# Patient Record
Sex: MALE | Race: WHITE
[De-identification: names, ages, dates, MRNs, and addresses within clinical notes are randomized per-mention and may not be internally consistent; named-entity substitution may affect disease eponyms.]

---

## 2018-01-08 ENCOUNTER — HOSPITAL ENCOUNTER (INPATIENT)
Dept: HOSPITAL 26 - MED | Age: 53
LOS: 11 days | Discharge: HOME | DRG: 720 | End: 2018-01-19
Attending: FAMILY MEDICINE | Admitting: FAMILY MEDICINE
Payer: COMMERCIAL

## 2018-01-08 VITALS — WEIGHT: 118 LBS | HEIGHT: 63 IN | BODY MASS INDEX: 20.91 KG/M2

## 2018-01-08 VITALS — SYSTOLIC BLOOD PRESSURE: 94 MMHG | DIASTOLIC BLOOD PRESSURE: 53 MMHG

## 2018-01-08 VITALS — DIASTOLIC BLOOD PRESSURE: 53 MMHG | SYSTOLIC BLOOD PRESSURE: 87 MMHG

## 2018-01-08 VITALS — DIASTOLIC BLOOD PRESSURE: 68 MMHG | SYSTOLIC BLOOD PRESSURE: 110 MMHG

## 2018-01-08 DIAGNOSIS — D63.8: ICD-10-CM

## 2018-01-08 DIAGNOSIS — Z99.11: ICD-10-CM

## 2018-01-08 DIAGNOSIS — Z79.899: ICD-10-CM

## 2018-01-08 DIAGNOSIS — E11.51: ICD-10-CM

## 2018-01-08 DIAGNOSIS — J15.212: ICD-10-CM

## 2018-01-08 DIAGNOSIS — I70.209: ICD-10-CM

## 2018-01-08 DIAGNOSIS — I69.354: ICD-10-CM

## 2018-01-08 DIAGNOSIS — N39.0: ICD-10-CM

## 2018-01-08 DIAGNOSIS — G93.40: ICD-10-CM

## 2018-01-08 DIAGNOSIS — D68.59: ICD-10-CM

## 2018-01-08 DIAGNOSIS — Z93.0: ICD-10-CM

## 2018-01-08 DIAGNOSIS — G47.411: ICD-10-CM

## 2018-01-08 DIAGNOSIS — B96.5: ICD-10-CM

## 2018-01-08 DIAGNOSIS — E11.43: ICD-10-CM

## 2018-01-08 DIAGNOSIS — G90.9: ICD-10-CM

## 2018-01-08 DIAGNOSIS — J40: ICD-10-CM

## 2018-01-08 DIAGNOSIS — J96.21: ICD-10-CM

## 2018-01-08 DIAGNOSIS — L89.159: ICD-10-CM

## 2018-01-08 DIAGNOSIS — A41.9: Primary | ICD-10-CM

## 2018-01-08 DIAGNOSIS — N17.0: ICD-10-CM

## 2018-01-08 DIAGNOSIS — I61.9: ICD-10-CM

## 2018-01-08 DIAGNOSIS — I10: ICD-10-CM

## 2018-01-08 DIAGNOSIS — G93.89: ICD-10-CM

## 2018-01-08 DIAGNOSIS — J69.0: ICD-10-CM

## 2018-01-08 DIAGNOSIS — B96.89: ICD-10-CM

## 2018-01-08 DIAGNOSIS — E43: ICD-10-CM

## 2018-01-08 DIAGNOSIS — Z93.1: ICD-10-CM

## 2018-01-08 DIAGNOSIS — G40.909: ICD-10-CM

## 2018-01-08 LAB
ALBUMIN FLD-MCNC: 2.2 G/DL (ref 3.4–5)
AMYLASE SERPL-CCNC: 27 U/L (ref 25–115)
ANION GAP SERPL CALCULATED.3IONS-SCNC: 10 MMOL/L (ref 8–16)
APPEARANCE UR: (no result)
AST SERPL-CCNC: 25 U/L (ref 15–37)
BARBITURATES UR QL SCN: (no result) NG/ML
BENZODIAZ UR QL SCN: (no result) NG/ML
BILIRUB SERPL-MCNC: 0.2 MG/DL (ref 0–1)
BILIRUB UR QL STRIP: NEGATIVE
BUN SERPL-MCNC: 23 MG/DL (ref 7–18)
BZE UR QL SCN: (no result) NG/ML
CANNABINOIDS UR QL SCN: (no result) NG/ML
CHLORIDE SERPL-SCNC: 104 MMOL/L (ref 98–107)
CHOLEST/HDLC SERPL: 3.7 {RATIO} (ref 1–4.5)
CO2 SERPL-SCNC: 33.3 MMOL/L (ref 21–32)
COLOR UR: YELLOW
CREAT SERPL-MCNC: 0.8 MG/DL (ref 0.7–1.3)
ERYTHROCYTE [DISTWIDTH] IN BLOOD BY AUTOMATED COUNT: 13.7 % (ref 11.6–13.7)
GFR SERPL CREATININE-BSD FRML MDRD: 130 ML/MIN (ref 90–?)
GLUCOSE SERPL-MCNC: 150 MG/DL (ref 74–106)
GLUCOSE UR STRIP-MCNC: NEGATIVE MG/DL
HCT VFR BLD AUTO: 24.8 % (ref 36–52)
HDLC SERPL-MCNC: 26 MG/DL (ref 40–60)
HGB BLD-MCNC: 8.3 G/DL (ref 12–18)
HGB UR QL STRIP: (no result)
LDLC SERPL CALC-MCNC: 47 MG/DL (ref 60–100)
LEUKOCYTE ESTERASE UR QL STRIP: (no result)
LIPASE SERPL-CCNC: 117 U/L (ref 73–393)
LYMPHOCYTES NFR BLD MANUAL: 4 % (ref 20–46)
MAGNESIUM SERPL-MCNC: 1.9 MG/DL (ref 1.8–2.4)
MCH RBC QN AUTO: 30 PG (ref 27–31)
MCHC RBC AUTO-ENTMCNC: 33 G/DL (ref 33–37)
MCV RBC AUTO: 89 FL (ref 80–94)
MONOCYTES NFR BLD MANUAL: 3 % (ref 5–12)
NITRITE UR QL STRIP: POSITIVE
OPIATES UR QL SCN: (no result) NG/ML
PCP UR QL SCN: (no result) NG/ML
PH UR STRIP: 7 [PH] (ref 5–9)
PHOSPHATE SERPL-MCNC: 4.2 MG/DL (ref 2.5–4.9)
PLATELET # BLD AUTO: 541 K/UL (ref 140–450)
POTASSIUM SERPL-SCNC: 4.3 MMOL/L (ref 3.5–5.1)
PROTHROMBIN TIME: 10.7 SECS (ref 10.8–13.4)
RBC # BLD AUTO: 2.8 MIL/UL (ref 4.2–6.1)
RBC #/AREA URNS HPF: (no result) /HPF (ref 0–5)
SODIUM SERPL-SCNC: 143 MMOL/L (ref 136–145)
T4 FREE SERPL-MCNC: 1.15 NG/DL (ref 0.76–1.46)
TRIGL SERPL-MCNC: 121 MG/DL (ref 30–150)
TSH SERPL DL<=0.05 MIU/L-ACNC: 1.89 UIU/ML (ref 0.34–3.74)
WBC # BLD AUTO: 31.6 K/UL (ref 4.8–10.8)
WBC,URINE: (no result) /HPF (ref 0–5)

## 2018-01-08 PROCEDURE — C9113 INJ PANTOPRAZOLE SODIUM, VIA: HCPCS

## 2018-01-08 PROCEDURE — 5A1955Z RESPIRATORY VENTILATION, GREATER THAN 96 CONSECUTIVE HOURS: ICD-10-PCS | Performed by: FAMILY MEDICINE

## 2018-01-08 PROCEDURE — 5A1935Z RESPIRATORY VENTILATION, LESS THAN 24 CONSECUTIVE HOURS: ICD-10-PCS | Performed by: FAMILY MEDICINE

## 2018-01-08 RX ADMIN — SODIUM CHLORIDE SCH MLS/HR: 9 INJECTION, SOLUTION INTRAVENOUS at 23:24

## 2018-01-08 NOTE — NUR
ADMITTED THIS 52 YEAR OLD MALE FROM ER PER [facility] WITH CC OF ALOC AND BLEEDING FROM 
TRACHEOSTOMY, TRANSFERRED TO BED AND ATTACHED TO VENTILATOR BY RT LEONARDO, ASSESSMENT DONE, 
VITAL SIGNS STABLE, PT AWAKE, APHASIC, WITH TRACH TO VENT, NO BLEEDING NOTED TO TRACHEOSTOMY 
SITE, G-TUBE CLAMPED, LEE CATHETER DRAINING YELLOW URINE, BEDBOUND, BM WITH BLACK SOFT 
STOOL, MODERATE AMOUNT, PERINEAL CARE DONE, HEALED WOUND TO UPPER BACK, SACRAL PRESSURE 
ULCER HEALING, DRESSING CHANGE DONE WITH OPTIFOAM APPLIED, SAFETY MEASURES IN PLACE, SIDE 
RAILS UP AND BED ALARM ON, WILL REPOSITION Q2H AND OFFLOAD PRESSURE AREAS.

## 2018-01-08 NOTE — NUR
PATIENT TRANSFERRED TO ROOM 124B VIA 6LPM AMBUBAG AND THEN PLACED ON VENTILATOR AC-12, 400, 
+5 FIO2 28% SAO2 100% BS BILATERAL HR 106BPM

## 2018-01-08 NOTE — NUR
52M BIBA FROM [facility] FOR ALOC SINCE THIS AM AND BLEEDING FROM TRACH COUPLE OF DAYS 
AGO; PER REPORT FROM [facility]IC, PT NEURO BASELINE IS AO, ABLE TO FOLLOW 
COMMANDS. PER REPORT, PT'S TRACH WAS CHANGED TODAY. PT HAS SPONTANEOUS EYE 
OPENING, EQUAL BL ARM , AND FOLLOW SIMPLE COMMANDS. RR ARE EVEN AND 
TACHYPNEIC. RT BY BEDSIDE, TRACH TO VENT. PT ARRIVED WITH G TUBE AND LEE. NAD 
AT THIS TIME. ER MD AWARE OF PT STATUS. WILL CONTINUE TO MONITOR.

## 2018-01-08 NOTE — NUR
Patient will be admitted to care of DR SANTIAGO. Admited to TELE 124B.  Will 
go to room 124B. Belongings list completed.  Report to VALERIA MADRID .

## 2018-01-09 VITALS — SYSTOLIC BLOOD PRESSURE: 138 MMHG | DIASTOLIC BLOOD PRESSURE: 56 MMHG

## 2018-01-09 VITALS — SYSTOLIC BLOOD PRESSURE: 134 MMHG | DIASTOLIC BLOOD PRESSURE: 71 MMHG

## 2018-01-09 VITALS — SYSTOLIC BLOOD PRESSURE: 160 MMHG | DIASTOLIC BLOOD PRESSURE: 78 MMHG

## 2018-01-09 VITALS — SYSTOLIC BLOOD PRESSURE: 130 MMHG | DIASTOLIC BLOOD PRESSURE: 81 MMHG

## 2018-01-09 VITALS — DIASTOLIC BLOOD PRESSURE: 76 MMHG | SYSTOLIC BLOOD PRESSURE: 143 MMHG

## 2018-01-09 LAB
ANION GAP SERPL CALCULATED.3IONS-SCNC: 12.5 MMOL/L (ref 8–16)
BASOPHILS # BLD AUTO: 0.5 K/UL (ref 0–0.22)
BASOPHILS NFR BLD AUTO: 1.9 % (ref 0–2)
BUN SERPL-MCNC: 19 MG/DL (ref 7–18)
CHLORIDE SERPL-SCNC: 107 MMOL/L (ref 98–107)
CO2 SERPL-SCNC: 30.1 MMOL/L (ref 21–32)
CREAT SERPL-MCNC: 0.5 MG/DL (ref 0.7–1.3)
EOSINOPHIL # BLD AUTO: 0.2 K/UL (ref 0–0.4)
EOSINOPHIL NFR BLD AUTO: 0.8 % (ref 0–4)
ERYTHROCYTE [DISTWIDTH] IN BLOOD BY AUTOMATED COUNT: 13.6 % (ref 11.6–13.7)
GFR SERPL CREATININE-BSD FRML MDRD: 223 ML/MIN (ref 90–?)
GLUCOSE SERPL-MCNC: 126 MG/DL (ref 74–106)
HCT VFR BLD AUTO: 23.6 % (ref 36–52)
HGB BLD-MCNC: 7.7 G/DL (ref 12–18)
IRON SERPL-MCNC: 8 UG/DL (ref 35–150)
LYMPHOCYTES # BLD AUTO: 1.7 K/UL (ref 2–11.5)
LYMPHOCYTES NFR BLD AUTO: 6.5 % (ref 20.5–51.1)
MAGNESIUM SERPL-MCNC: 1.9 MG/DL (ref 1.8–2.4)
MCH RBC QN AUTO: 29 PG (ref 27–31)
MCHC RBC AUTO-ENTMCNC: 33 G/DL (ref 33–37)
MCV RBC AUTO: 90 FL (ref 80–94)
MONOCYTES # BLD AUTO: 0.9 K/UL (ref 0.8–1)
MONOCYTES NFR BLD AUTO: 3.4 % (ref 1.7–9.3)
NEUTROPHILS # BLD AUTO: 23 K/UL (ref 1.8–7.7)
NEUTROPHILS NFR BLD AUTO: 87.4 % (ref 42.2–75.2)
PHOSPHATE SERPL-MCNC: 3.1 MG/DL (ref 2.5–4.9)
PLATELET # BLD AUTO: 530 K/UL (ref 140–450)
POTASSIUM SERPL-SCNC: 3.6 MMOL/L (ref 3.5–5.1)
RBC # BLD AUTO: 2.62 MIL/UL (ref 4.2–6.1)
SODIUM SERPL-SCNC: 146 MMOL/L (ref 136–145)
TIBC SERPL-MCNC: 123 UG/DL (ref 250–450)
WBC # BLD AUTO: 26.3 K/UL (ref 4.8–10.8)

## 2018-01-09 RX ADMIN — SODIUM CHLORIDE SCH MLS/HR: 9 INJECTION, SOLUTION INTRAVENOUS at 23:16

## 2018-01-09 RX ADMIN — IPRATROPIUM BROMIDE AND ALBUTEROL SULFATE SCH ML: .5; 3 SOLUTION RESPIRATORY (INHALATION) at 19:09

## 2018-01-09 RX ADMIN — IPRATROPIUM BROMIDE AND ALBUTEROL SULFATE SCH ML: .5; 3 SOLUTION RESPIRATORY (INHALATION) at 12:28

## 2018-01-09 RX ADMIN — LEVETIRACETAM SCH MG: 100 SOLUTION ORAL at 20:53

## 2018-01-09 RX ADMIN — Medication SCH MG: at 20:53

## 2018-01-09 RX ADMIN — DEXTROSE SCH MLS/HR: 50 INJECTION, SOLUTION INTRAVENOUS at 12:44

## 2018-01-09 RX ADMIN — MINERAL SUPPLEMENT IRON 300 MG / 5 ML STRENGTH LIQUID 100 PER BOX UNFLAVORED SCH MG: at 20:53

## 2018-01-09 RX ADMIN — MINERAL SUPPLEMENT IRON 300 MG / 5 ML STRENGTH LIQUID 100 PER BOX UNFLAVORED SCH MG: at 10:02

## 2018-01-09 RX ADMIN — PSYLLIUM HUSK SCH GM: 3.4 POWDER ORAL at 16:38

## 2018-01-09 RX ADMIN — DEXTROSE SCH MLS/HR: 50 INJECTION, SOLUTION INTRAVENOUS at 05:03

## 2018-01-09 RX ADMIN — LEVETIRACETAM SCH MG: 100 SOLUTION ORAL at 10:03

## 2018-01-09 RX ADMIN — SODIUM CHLORIDE SCH MLS/HR: 9 INJECTION, SOLUTION INTRAVENOUS at 10:04

## 2018-01-09 RX ADMIN — PSYLLIUM HUSK SCH GM: 3.4 POWDER ORAL at 10:02

## 2018-01-09 RX ADMIN — LEVOFLOXACIN SCH MLS/HR: 5 INJECTION, SOLUTION INTRAVENOUS at 20:53

## 2018-01-09 RX ADMIN — DEXTROSE SCH MLS/HR: 50 INJECTION, SOLUTION INTRAVENOUS at 17:09

## 2018-01-09 RX ADMIN — PANTOPRAZOLE SODIUM SCH MG: 40 INJECTION, POWDER, FOR SOLUTION INTRAVENOUS at 10:03

## 2018-01-09 RX ADMIN — DEXTROSE SCH MLS/HR: 50 INJECTION, SOLUTION INTRAVENOUS at 23:38

## 2018-01-09 RX ADMIN — IPRATROPIUM BROMIDE AND ALBUTEROL SULFATE SCH ML: .5; 3 SOLUTION RESPIRATORY (INHALATION) at 07:00

## 2018-01-09 RX ADMIN — ACETAMINOPHEN PRN MG: 160 SOLUTION ORAL at 19:40

## 2018-01-09 RX ADMIN — Medication SCH EACH: at 10:03

## 2018-01-09 RX ADMIN — PSYLLIUM HUSK SCH GM: 3.4 POWDER ORAL at 12:45

## 2018-01-09 RX ADMIN — Medication SCH DEV: at 16:38

## 2018-01-09 RX ADMIN — Medication SCH MG: at 10:03

## 2018-01-09 RX ADMIN — Medication SCH DEV: at 20:57

## 2018-01-09 NOTE — NUR
VENT CHECK, I\L TX GIVEN WITH DUONEB 3ML WITH NO ADVERSE REACTION POST TX B\S ARE COARSE AND 
SNX PT SMALL AMT OF BLOOD TINT SECRETIONS

## 2018-01-09 NOTE — NUR
VITAL SIGNS STABLE, OPEN EYES TO TOUCH, IVF INFUSING WELL, CALLED [facility] AND TALKED TO CHARGE 
NURSE JOHANNA, NO RECORD OF VACCINATION STATUS AND NO ISOLATION IN THERE FACILITY, WILL 
FOLLOW UP WITH FAMILY MEMBERS IN AM, CONTINUE TO MONITOR CLOSELY.

## 2018-01-09 NOTE — NUR
PT OPEN EYES TO TOUCH, VITAL SIGNS STABLE, AFEBRILE, ORAL CARE DONE, PT OPENS MOUTH ON 
COMMAND, SUCTION SECRETION PRN, IV ANTIBIOTIC INFUSING WELL, CONTINUE TO MONITOR CLOSELY.

## 2018-01-09 NOTE — NUR
PT AWAKE, PT CAN MOVE RT ARM AND ABLE TO SCRATCH FACE, RESTLESS, SR-122 ON TELE, 5ML G-TUBE 
RESIDUAL NOTED, TYLENOL GIVEN THRU G-TUBE, MONITORED CLOSELY.

## 2018-01-09 NOTE — NUR
BED BATH GIVEN, PERICARE DONE, DARK GREEN STOOL SOFT, COLLECTED, LEE CATH CARE DONE, PT 
RUTH WELL, RT AT BEDSIDE.

## 2018-01-09 NOTE — NUR
PT AWAKE, SWEATING, ORAL TEMP OF 98.9, FLACC-0, KEEP CLEAN AND DRY, IVF INFUSING WELL, 
G-TUBE ON-GOING, HOB ELEVATED, MONITORED CLOSELY.

## 2018-01-09 NOTE — NUR
PATIENT HAS BEEN SCREENED AND CATEGORIZED AS HIGH NUTRITION RISK. PATIENT WILL BE SEEN 
WITHIN 1-2 DAYS OF ADMISSION.



1/9/18 - 1/10/18



RADHA JONES RD

## 2018-01-09 NOTE — NUR
REPORT RECEIVED FROM NIGHT SHIFT NURSE, PT AWAKE LOOKING AROUND, RESP EVEN UNLABORED WITH 
TRACH TO VENT, RT AT BEDSIDE AT THIS TIME, GT FEED ON-GOING, GT SITE CLEAR, SKIN WARM DRY 
COLOR WNL, PT APPEARS IN NO PAIN OR DISCOMFORT, PLAN OF CARE REVIEWED, NO IMMEDIATE NEEDS 
IDENTIFIED, CALL BELL WITHIN REACH, SIDE RAILS UP, BED LOCKED IN LOW POSITION WILL CONTINUE 
TO MONITOR.

## 2018-01-09 NOTE — NUR
vent check, sxn pt moderate amt of blood tint secretions, family member at bedside trach 
care done: change trach gauze and pt is awake with no signs of distress noted at this time

## 2018-01-09 NOTE — NUR
PT AWAKE, NO SIGNS OF DISTRESS, RT IN THE ROOM, BEDSIDE REPORT GIVEN TO RN ABEL FOR 
CONTINUITY OF CARE.

## 2018-01-09 NOTE — NUR
IVF RATE DECREASED TO 50ML/HR, FEEDING WATER FREQUENCY CHANGED TO 150ML/4HRS AS PER ORDER.  
PT REMAINS ON TRACH TO VENT, RESP EVEN UNLABORED, SKIN WARM DRY COLOR WNL, LEE DRAIING 
WELL, WILL CONTINUE TO MONITOR.

## 2018-01-09 NOTE — NUR
NO G-TUBE RESIDUAL NOTED, DIABETISOURCE FEEDING STARTED AT 70ML/H WITH FREE WATER FLUSH 150 
Q6H, TUBE FEEDING TO RUN DAILY FOR 20 HOURS FROM 1200 TO 0800, KEEP HOB ELEVATED AT ALL 
TIMES, SCD'S APPLIED TO BLE, MONITORED CLOSELY.

## 2018-01-09 NOTE — NUR
DAUGHTER AGT BEDSIDE, PT RESPONSIVE, NODS/SHAKES HEAD FOR QUESTIONS IN [place] 
APPROPRIATELY, PURPOSEFUL MOVEMENT OF RIGHT ARM, SUCH AS WIPE FACE, DR STERLING AT BEDSIDE.

## 2018-01-09 NOTE — NUR
RECEIVED PT AWAKE, APHASIC, ON TRACH TO VENT, VITAL SIGNS TAKEN, BP SLIGHTLY ELEVATED AND ST 
ON TELE, WILL GIVE DUE BP MEDICATION, IVF INFUSING WELL, G-TUBE FEEDING ON-GOING, HOB 
ELEVATED AT ALL TIMES, LEE CATH DRAINING YELLOW URINE, SCD'S IN PLACE, REPOSITION Q2H AND 
OFFLOAD PRESSURE AREAS, SAFETY MEASURES IN PLACE, SIDE RAILS UP AND BED ALARM ON.

## 2018-01-09 NOTE — NUR
SOFT, DARK GREEN STOOL, DIAPER CHANGED, PERICARE DONE, POSITION CHANGED, PT REMAINS ON TRACH 
TO VENT, LEE DRAINING WELL, GT FEED ONGOING AT 70ML/HR, PT SHAKES HEAD SLIGHTLY WHEN ASKED 
IF IN PAIN, REMAINS ON CARDIAC MONITOR, ALL ALARMS ON, WILL CONTINUE TO MONTIOR.

## 2018-01-09 NOTE — NUR
AM MEDS GIVEN PER GT, PT RUTH WELL, TRACH TO VENT, ALARMS ON, LEE DRAINING WELL, IVF 
INFUSING WELL, SITE CLEAR, WILL CONTINUE TO MONITOR

## 2018-01-09 NOTE — NUR
REC'D PT ON CARESCAPE SETTINGS AC12  PEEP 5 FIO2 28% ALARMS ON AND FUNCTIONING 
PROPERLY, AMBU BAG IS AT SIDE OF VENT AND VENT IS PLUGGED INTO RED OUTLET, NO HHN GIVEN PT 
IS SLEEPING WITH NO SIGNS OF DISTRESS NOTED AT THIS TIME, B\S CLEAR BILATERALLY, SXN PT 
SMALL AMT OF BLOOD TINT SECRETIONS, PT IS TRACH WITH PORTEX 8 AND SKIN INTEGRITY IS INTACT 
CUFF PRESSURE IS 26 CM H20

## 2018-01-09 NOTE — NUR
BLOOD SUGAR CHECKED WITH 140 RESULT, 10ML G-TUBE RESIDUAL NOTED, DUE MEDS ADMINISTERED, 
REPOSITIONED AND OFFLOAD PRESSURES AREAS, ALL NEEDS ANTICIPATED.

## 2018-01-10 VITALS — DIASTOLIC BLOOD PRESSURE: 90 MMHG | SYSTOLIC BLOOD PRESSURE: 136 MMHG

## 2018-01-10 VITALS — SYSTOLIC BLOOD PRESSURE: 109 MMHG | DIASTOLIC BLOOD PRESSURE: 68 MMHG

## 2018-01-10 VITALS — DIASTOLIC BLOOD PRESSURE: 89 MMHG | SYSTOLIC BLOOD PRESSURE: 155 MMHG

## 2018-01-10 VITALS — DIASTOLIC BLOOD PRESSURE: 80 MMHG | SYSTOLIC BLOOD PRESSURE: 131 MMHG

## 2018-01-10 VITALS — DIASTOLIC BLOOD PRESSURE: 87 MMHG | SYSTOLIC BLOOD PRESSURE: 149 MMHG

## 2018-01-10 LAB
FOLATE SERPL-MCNC: 8.7 NG/ML (ref 3–?)
T3RU NFR SERPL: 31 % (ref 24–39)
T4 SERPL-MCNC: 6 UG/DL (ref 4.5–12)
TRANSFERRIN SERPL-MCNC: 122 MG/DL (ref 200–370)
VIT B12 SERPL-MCNC: 1186 PG/ML (ref 232–1245)

## 2018-01-10 RX ADMIN — Medication SCH MG: at 09:00

## 2018-01-10 RX ADMIN — PANTOPRAZOLE SODIUM SCH MG: 40 INJECTION, POWDER, FOR SOLUTION INTRAVENOUS at 09:00

## 2018-01-10 RX ADMIN — Medication SCH MG: at 21:47

## 2018-01-10 RX ADMIN — PSYLLIUM HUSK SCH GM: 3.4 POWDER ORAL at 13:00

## 2018-01-10 RX ADMIN — IPRATROPIUM BROMIDE AND ALBUTEROL SULFATE SCH ML: .5; 3 SOLUTION RESPIRATORY (INHALATION) at 13:36

## 2018-01-10 RX ADMIN — PSYLLIUM HUSK SCH GM: 3.4 POWDER ORAL at 09:00

## 2018-01-10 RX ADMIN — CHLORHEXIDINE GLUCONATE SCH EA: 2 SOLUTION TOPICAL at 18:19

## 2018-01-10 RX ADMIN — LEVOFLOXACIN SCH MLS/HR: 5 INJECTION, SOLUTION INTRAVENOUS at 21:46

## 2018-01-10 RX ADMIN — PSYLLIUM HUSK SCH GM: 3.4 POWDER ORAL at 16:49

## 2018-01-10 RX ADMIN — MINERAL SUPPLEMENT IRON 300 MG / 5 ML STRENGTH LIQUID 100 PER BOX UNFLAVORED SCH MG: at 09:00

## 2018-01-10 RX ADMIN — IPRATROPIUM BROMIDE AND ALBUTEROL SULFATE SCH ML: .5; 3 SOLUTION RESPIRATORY (INHALATION) at 19:36

## 2018-01-10 RX ADMIN — Medication SCH DEV: at 07:30

## 2018-01-10 RX ADMIN — MINERAL SUPPLEMENT IRON 300 MG / 5 ML STRENGTH LIQUID 100 PER BOX UNFLAVORED SCH MG: at 21:46

## 2018-01-10 RX ADMIN — MUPIROCIN SCH GM: 20 OINTMENT TOPICAL at 18:18

## 2018-01-10 RX ADMIN — IPRATROPIUM BROMIDE AND ALBUTEROL SULFATE SCH ML: .5; 3 SOLUTION RESPIRATORY (INHALATION) at 07:30

## 2018-01-10 RX ADMIN — Medication SCH EA: at 18:18

## 2018-01-10 RX ADMIN — LEVETIRACETAM SCH MG: 100 SOLUTION ORAL at 09:00

## 2018-01-10 RX ADMIN — LEVETIRACETAM SCH MG: 100 SOLUTION ORAL at 21:46

## 2018-01-10 RX ADMIN — Medication SCH DEV: at 21:49

## 2018-01-10 RX ADMIN — DEXTROSE SCH MLS/HR: 50 INJECTION, SOLUTION INTRAVENOUS at 12:00

## 2018-01-10 RX ADMIN — DEXTROSE SCH MLS/HR: 50 INJECTION, SOLUTION INTRAVENOUS at 06:00

## 2018-01-10 RX ADMIN — SODIUM CHLORIDE SCH MLS/HR: 9 INJECTION, SOLUTION INTRAVENOUS at 19:16

## 2018-01-10 RX ADMIN — ACETAMINOPHEN PRN MG: 160 SOLUTION ORAL at 01:36

## 2018-01-10 RX ADMIN — Medication SCH DEV: at 16:48

## 2018-01-10 RX ADMIN — Medication SCH DEV: at 11:30

## 2018-01-10 RX ADMIN — Medication SCH EACH: at 09:00

## 2018-01-10 RX ADMIN — DEXTROSE SCH MLS/HR: 50 INJECTION, SOLUTION INTRAVENOUS at 18:21

## 2018-01-10 NOTE — NUR
1/10/2018

RD INITIAL ASSESSMENT COMPLETED



1. PT TO TOLERATE >90% OF ESTIMATED CALORIE AND PRO NEEDS/DAY WITHIN 2-3 DAYS.



2. PT GLUCOSE LEVELS TO TREND TOWARDS NORMAL LIMITS WITHIN 2-3 DAYS. 



DIETITIAN WILL MONITOR PO INTAKE, NUTRITION-RELATED LABS TRENDING WNL, SKIN INTEGRITY, 
WEIGHTS, GI FUNCTION.



DISCHARGE PLAN: PENDING CLINICAL OUTCOME.





HAYLEY TAPIA RD

## 2018-01-10 NOTE — NUR
SEEN PT AWAKE. BLOOD SUGAR CHECKED: 117. NO COVERAGE NEEDED. NO RESIDUAL ON GTUBE NOTED. 
MEDICATIONS GIVEN AS ORDERED. WILL CONTINUE TO MONITOR.

## 2018-01-10 NOTE — NUR
RECEIVED PT ON THE SAME VENT SETTINGS, FAMILY AT BED SIDE, HHN IN LINE, NO DISTRESS NOTED, 
SX MOD WHITE THIN SECRTION

## 2018-01-10 NOTE — NUR
continued to monitor pt on vent with settings as charted breath sounds present coarse sxn pt 
with min to mod amt off white secs trach care done vent plugged into red outlet ambu bag at 
bedside

## 2018-01-10 NOTE — NUR
SEEN PT HOLDING HIS GTUBE. PT INFORMED NOT TO TOUCH IT. PT REPOSITIONED FOR COMFORT. PT 
SUCTIONED W/ SMALL AMOUNT OF CREAM COLOR SPUTUM. PT KEPT COMFORTABLE. WILL CONTINUE TO 
MONITOR.

## 2018-01-10 NOTE — NUR
recived pt on vent with settings as charted breath sounds present bilat  coarse sxn pt with 
min amt off white secs ambu bag at bedside trach site secure vent plugged into red outlet 
will continue to monitor pt on vent

## 2018-01-10 NOTE — NUR
PATIENT LYING IN BED. NO DISTRESS NOTED. DENIES ANY PAIN, FLACC 0. VENTILATOR TO TRACH 
RUNNING ON SAME SETTINGS. CONDITION UNCHANGED. SCHEDULED MEDICATIONS DUE GIVEN. SAFETY 
MEASURES IN PLACE, CALL LIGHT WITHIN REACH. WILL CONTINUE TO MONITOR.

## 2018-01-10 NOTE — NUR
PATIENT LYING IN BED ON RIGHT SIDE. NO DISTRESS NOTED. DENIES ANY PAIN, FLACC 0. 
RESPIRATIONS EVEN, UNLABORED, TRACH TO VENT DEPENDENT. VENT SETTING UNCHANGED WITH O2 SAT AT 
100%. DID NOT ADMINISTER METAMUCIL MEDICATION DUE TO PATIENT HAVING LOOSE STOOLS X6 WITHIN 
THE PAST 24 HOURS. SAFETY MEASURES IN PLACE, CALL LIGHT WITHIN REACH. WILL CONTINUE TO 
MONITOR.

## 2018-01-11 VITALS — DIASTOLIC BLOOD PRESSURE: 77 MMHG | SYSTOLIC BLOOD PRESSURE: 138 MMHG

## 2018-01-11 VITALS — DIASTOLIC BLOOD PRESSURE: 86 MMHG | SYSTOLIC BLOOD PRESSURE: 144 MMHG

## 2018-01-11 VITALS — SYSTOLIC BLOOD PRESSURE: 146 MMHG | DIASTOLIC BLOOD PRESSURE: 83 MMHG

## 2018-01-11 VITALS — SYSTOLIC BLOOD PRESSURE: 106 MMHG

## 2018-01-11 VITALS — DIASTOLIC BLOOD PRESSURE: 88 MMHG | SYSTOLIC BLOOD PRESSURE: 163 MMHG

## 2018-01-11 VITALS — SYSTOLIC BLOOD PRESSURE: 150 MMHG | DIASTOLIC BLOOD PRESSURE: 88 MMHG

## 2018-01-11 LAB
ANION GAP SERPL CALCULATED.3IONS-SCNC: 11.1 MMOL/L (ref 8–16)
BASOPHILS # BLD AUTO: 0.2 K/UL (ref 0–0.22)
BASOPHILS NFR BLD AUTO: 1.9 % (ref 0–2)
BUN SERPL-MCNC: 14 MG/DL (ref 7–18)
CHLORIDE SERPL-SCNC: 106 MMOL/L (ref 98–107)
CO2 SERPL-SCNC: 30.6 MMOL/L (ref 21–32)
CREAT SERPL-MCNC: 0.5 MG/DL (ref 0.7–1.3)
EOSINOPHIL # BLD AUTO: 0.1 K/UL (ref 0–0.4)
EOSINOPHIL NFR BLD AUTO: 1.2 % (ref 0–4)
ERYTHROCYTE [DISTWIDTH] IN BLOOD BY AUTOMATED COUNT: 13.3 % (ref 11.6–13.7)
GFR SERPL CREATININE-BSD FRML MDRD: 223 ML/MIN (ref 90–?)
GLUCOSE SERPL-MCNC: 110 MG/DL (ref 74–106)
HCT VFR BLD AUTO: 25.5 % (ref 36–52)
HGB BLD-MCNC: 8.5 G/DL (ref 12–18)
LYMPHOCYTES # BLD AUTO: 1.9 K/UL (ref 2–11.5)
LYMPHOCYTES NFR BLD AUTO: 20.4 % (ref 20.5–51.1)
MAGNESIUM SERPL-MCNC: 1.8 MG/DL (ref 1.8–2.4)
MCH RBC QN AUTO: 30 PG (ref 27–31)
MCHC RBC AUTO-ENTMCNC: 33 G/DL (ref 33–37)
MCV RBC AUTO: 89 FL (ref 80–94)
MONOCYTES # BLD AUTO: 0.7 K/UL (ref 0.8–1)
MONOCYTES NFR BLD AUTO: 7.8 % (ref 1.7–9.3)
NEUTROPHILS # BLD AUTO: 6.5 K/UL (ref 1.8–7.7)
NEUTROPHILS NFR BLD AUTO: 68.7 % (ref 42.2–75.2)
PHOSPHATE SERPL-MCNC: 4.6 MG/DL (ref 2.5–4.9)
PLATELET # BLD AUTO: 616 K/UL (ref 140–450)
POTASSIUM SERPL-SCNC: 3.7 MMOL/L (ref 3.5–5.1)
RBC # BLD AUTO: 2.88 MIL/UL (ref 4.2–6.1)
SODIUM SERPL-SCNC: 144 MMOL/L (ref 136–145)
WBC # BLD AUTO: 9.4 K/UL (ref 4.8–10.8)

## 2018-01-11 RX ADMIN — PSYLLIUM HUSK SCH GM: 3.4 POWDER ORAL at 16:22

## 2018-01-11 RX ADMIN — MINERAL SUPPLEMENT IRON 300 MG / 5 ML STRENGTH LIQUID 100 PER BOX UNFLAVORED SCH MG: at 21:12

## 2018-01-11 RX ADMIN — IPRATROPIUM BROMIDE AND ALBUTEROL SULFATE SCH ML: .5; 3 SOLUTION RESPIRATORY (INHALATION) at 13:06

## 2018-01-11 RX ADMIN — IPRATROPIUM BROMIDE AND ALBUTEROL SULFATE SCH ML: .5; 3 SOLUTION RESPIRATORY (INHALATION) at 19:59

## 2018-01-11 RX ADMIN — Medication SCH EA: at 09:33

## 2018-01-11 RX ADMIN — PANTOPRAZOLE SODIUM SCH MG: 40 INJECTION, POWDER, FOR SOLUTION INTRAVENOUS at 09:32

## 2018-01-11 RX ADMIN — MUPIROCIN SCH GM: 20 OINTMENT TOPICAL at 16:21

## 2018-01-11 RX ADMIN — Medication SCH DEV: at 17:04

## 2018-01-11 RX ADMIN — MINERAL SUPPLEMENT IRON 300 MG / 5 ML STRENGTH LIQUID 100 PER BOX UNFLAVORED SCH MG: at 09:33

## 2018-01-11 RX ADMIN — PSYLLIUM HUSK SCH GM: 3.4 POWDER ORAL at 09:00

## 2018-01-11 RX ADMIN — DEXTROSE SCH MLS/HR: 50 INJECTION, SOLUTION INTRAVENOUS at 12:01

## 2018-01-11 RX ADMIN — DEXTROSE SCH MLS/HR: 50 INJECTION, SOLUTION INTRAVENOUS at 06:00

## 2018-01-11 RX ADMIN — Medication SCH MG: at 09:33

## 2018-01-11 RX ADMIN — PSYLLIUM HUSK SCH GM: 3.4 POWDER ORAL at 12:07

## 2018-01-11 RX ADMIN — LEVETIRACETAM SCH MG: 100 SOLUTION ORAL at 21:13

## 2018-01-11 RX ADMIN — DEXTROSE SCH MLS/HR: 50 INJECTION, SOLUTION INTRAVENOUS at 00:19

## 2018-01-11 RX ADMIN — Medication SCH DEV: at 21:16

## 2018-01-11 RX ADMIN — SODIUM CHLORIDE SCH MLS/HR: 9 INJECTION, SOLUTION INTRAVENOUS at 21:13

## 2018-01-11 RX ADMIN — SODIUM CHLORIDE SCH MLS/HR: 9 INJECTION, SOLUTION INTRAVENOUS at 14:34

## 2018-01-11 RX ADMIN — LEVETIRACETAM SCH MG: 100 SOLUTION ORAL at 09:32

## 2018-01-11 RX ADMIN — IPRATROPIUM BROMIDE AND ALBUTEROL SULFATE SCH ML: .5; 3 SOLUTION RESPIRATORY (INHALATION) at 07:07

## 2018-01-11 RX ADMIN — Medication SCH DEV: at 11:57

## 2018-01-11 RX ADMIN — CHLORHEXIDINE GLUCONATE SCH EA: 2 SOLUTION TOPICAL at 16:22

## 2018-01-11 RX ADMIN — Medication SCH DEV: at 07:02

## 2018-01-11 RX ADMIN — Medication SCH MG: at 21:13

## 2018-01-11 RX ADMIN — VANCOMYCIN HYDROCHLORIDE SCH MLS/HR: 1 INJECTION, SOLUTION INTRAVENOUS at 17:32

## 2018-01-11 RX ADMIN — Medication SCH EACH: at 09:33

## 2018-01-11 RX ADMIN — SODIUM CHLORIDE SCH MLS/HR: 9 INJECTION, SOLUTION INTRAVENOUS at 12:06

## 2018-01-11 NOTE — NUR
AWAKE NO EVIDENCE OF RESPIRATORY DISTRESS NOTED GOOD CHEST RISE DEEP TRACHEAL SUCTION FOR 
MODERATE THIN YELLOW SECRETIONS AIRWAY PATENT SPOUSE IN ROOM

## 2018-01-11 NOTE — NUR
PATIENT HAD A BOWEL MOVEMENT, SACRAL DRESSING SOILED. CHANGED DRESSING. PATIENT SACRAL WOUND 
APPEARS HEALED. NO OPEN WOUND NO DRAINAGE. SLIGHT WHITE SKIN ABOVE BUTT CRACK. PATIENT 
TOLERATED CHANGE WELL. NO SIGNS OR SYMPTOMS OF ACUTE DISTRESS NOTED. SAFETY MEASURES 
ENSURED. FLACC 0. WILL CONTINUE TO MONITOR.

## 2018-01-11 NOTE — NUR
RECEIVED HANDOFF REPORT FROM AM RN. PATIENT A&OX2. PATIENT IS TRACH TO VENT. FIO2 28% RATE 
12/MIN PEEP 5 FLOW 40. PATIENT HAS G TUBE PATENT AND INTACT. PATIENT HAS LEE CATH PATENT 
AND INTACT. PATIENT HAS SACRAL DRESSING, DRY AND INTACT. PATIENT IV PATENT AND INTACT. FLACC 
0. NO SIGNS OR SYMPTOMS OF ACUTE DISTRESS NOTED. SAFETY MEASURES ENSURED. FAMILY AT BEDSIDE. 
WILL CONTINUE TO MONITOR.

## 2018-01-11 NOTE — NUR
PATIENT LYING IN BED. NO DISTRESS NOTED. FAMILY MEMBER AT BEDSIDE. CONDITION UNCHANGED. 
SAFETY MEASURES IN PLACE, CALL LIGHT WITHIN REACH. WILL CONTINUE TO MONITOR.

## 2018-01-11 NOTE — NUR
RECEIVED REPORT FORM NIGHT SHIFT NURSE. PATIENT LYING IN BED COMFORTABLY. NO DISTRESS NOTED. 
RESPIRATIONS EVEN, UNLABORED, VENT TO TRACH DEPENDENT WITH O2 SAT % WITH VENT 
SETTINGS:FIO2:28%, VT:400, RATE: 12/MIN, FLOW:40, AND PEEP:5. AAOX2, APHASIC BUT ABLE TO 
UNDERSTAND. ABLE TO NOD YES OR NO WHEN ASKED QUESTIONS. DENIES ANY PAIN, FLACC 0. SKIN COLOR 
APPROPRIATE TO ETHNICITY, WARM TO TOUCH. LUNGS SOUNDS RHONCHI THROUGHOUT ALL LOBES. ABDOMEN 
SOFT, NON-DISTENDED. GTUBE SITE INTACT, PATENT, AND GTUBE FEEDING OFF REAL. WILL START AT 8 
AM AS PER ORDERS. IV SITE IS INTACT, PATENT AND INFUSING IVF AS PER ORDERS. LEE CATHETER 
IN PLACE, DRAINING CLEAR, YELLOW URINE. SAFETY MEASURES IN PLACE, CALL LIGHT WITHIN REACH, 
FALL PREVENTIONS IN PLACE. WILL CONTINUE TO MONITOR.

## 2018-01-11 NOTE — NUR
AWAKE NO SOB NOTED RESTING WELL BREATH SOUNDS CLEAR BILATERAL WITH GOOD CHEST RISE AND 
AERATION THROUGHOUT

## 2018-01-11 NOTE — NUR
PATIENT LYING IN BED WATCHING TV. NO DISTRESS NOTED. DENIES ANY PAIN, FLACC 0. ASSISTED CNA 
IN CLEANING AND REPOSITIONING PATIENT. SCHEDULED MEDICATIONS DUE GIVEN. METAMUCIL WITHHELD 
DUE TO PATIENT HAVING LOOSE STOOLS. VENTILATOR SETTINGS UNCHANGED. GTUBE FEEDING RUNNING AS 
PER ORDERS. IV SITE INTACT, PATENT, AND INFUSING. SAFETY MEASURES IN PLACE, CALL LIGHT 
WITHIN REACH. WILL CONTINUE TO MONITOR.

## 2018-01-11 NOTE — NUR
RECEIVED ON A GE CARESCAPE R860 VENTILATOR PLUGGED INTO RED OUTLET TOLERATING WELL WITHOUT 
ADVERSE REACTIONS NOTED TO A PORTEX DCT #8 AIRWAY SECURED WITH A YAZ TRACH TIE CUFF 
PRESSURE CHECKED AS NOTED AMBU BAG AT HOB LOC AWAKE BREATH SOUNCD RHNCONCHI BILATERAL WITH 
GOOD CHEST RISE DEEP TRACHEAL SUCTION FOR COPIOUS THICK YELLOW SECRETIONS AIRWAY PATENT

## 2018-01-11 NOTE — NUR
PATIENT LYING IN BED. FAMILY MEMBER AT BEDSIDE. NO DISTRESS NOTED. VENTILATOR SETTING 
UNCHANGED. CONDITION UNCHANGED. WILL CONTINUE TO MONITOR.

## 2018-01-11 NOTE — NUR
PATIENT LYING IN BED. NO DISTRESS NOTED. VENTILATOR SETTING UNCHANGED. SCHEDULED MEDICATIONS 
DUE GIVEN. IV SITE INTACT, PATENT, AND INFUSING. SAFETY MEASURES IN PLACE, CALL LIGHT WITHIN 
REACH. WILL CONTINUE TO MONITOR.

## 2018-01-11 NOTE — NUR
AWAKE STABLE NO EVIDENCE OF PULMONARY DISTRESS NOTED BREATH SOUNDS RHONCHI BILATERAL GOOD 
CHEST RISE DEEP TRACHEAL SUCTION FOR MODERATE THIN PALE YELLOW SECRETIONS AIRWAY PATENT

## 2018-01-11 NOTE — NUR
PT ON THE SAME VENT SETTINGS ALL NIGHT, NO CHANGES MADE,DURING THE NIGHT TRACH CARE DONE, 
CHANGED DRESSING, CLEAN THE AREA, CHANGED HME, NO DISTRESS NOTED

## 2018-01-11 NOTE — NUR
PM MEDS GIVEN WITH EDUCATION. G TUBE RESIDUAL 20ML. PATIENT TOLERATED WELL. FLACC 0. NO 
SIGNS OR SYMPTOMS OF ACUTE DISTRESS NOTED. SAFETY MEASURES ENSURED. WILL CONTINUE TO 
MONITOR.

## 2018-01-11 NOTE — NUR
PATIENT LYING IN BED. CONTINUALLY TRYING TO PULL ON IV TUBES AND VENTILATOR TUBES. MITTEN 
PLACED ON PATIENT'S RIGHT HAND FOR SAFETY. NO DISTRESS NOTED. DENIES ANY PAIN, FLACC 0. 
SAFETY MEASURES IN PLACE, CALL LIGHT WITHIN REACH. WILL CONTINUE TO MONITOR.

## 2018-01-11 NOTE — NUR
PT HAD BED BATH. PT'S IV SITE INFILTRATED. INSERTED NEW IV ON RT HAND G22 W/ GOOD BLOOD 
RETURN. IV RESUMED. BLOOD SUGAR CHECKED:104. NO COVERAGE NEEDED. PT KEPT COMFORTABLE. PT 
REMAINS ON CONTACT ISOLATION.

## 2018-01-11 NOTE — NUR
SEEN PT ASLEEP. IV CLEOCIN GIVEN AS ORDERED. PT HAD MEDIUM TO LARGE AMOUNT OF LOOSE STOOL. 
PERICARE RENDERED. HYDRAGUARD APPLIED. PT REPOSITIONED FOR COMFORT. WILL CONTINUE TO 
MONITOR.

## 2018-01-12 VITALS — SYSTOLIC BLOOD PRESSURE: 120 MMHG | DIASTOLIC BLOOD PRESSURE: 78 MMHG

## 2018-01-12 VITALS — DIASTOLIC BLOOD PRESSURE: 85 MMHG | SYSTOLIC BLOOD PRESSURE: 157 MMHG

## 2018-01-12 VITALS — DIASTOLIC BLOOD PRESSURE: 70 MMHG | SYSTOLIC BLOOD PRESSURE: 110 MMHG

## 2018-01-12 LAB
ANION GAP SERPL CALCULATED.3IONS-SCNC: 11.5 MMOL/L (ref 8–16)
BUN SERPL-MCNC: 12 MG/DL (ref 7–18)
CHLORIDE SERPL-SCNC: 106 MMOL/L (ref 98–107)
CO2 SERPL-SCNC: 29 MMOL/L (ref 21–32)
CREAT SERPL-MCNC: 0.5 MG/DL (ref 0.7–1.3)
ERYTHROCYTE [DISTWIDTH] IN BLOOD BY AUTOMATED COUNT: 13.3 % (ref 11.6–13.7)
GFR SERPL CREATININE-BSD FRML MDRD: 223 ML/MIN (ref 90–?)
GLUCOSE SERPL-MCNC: 131 MG/DL (ref 74–106)
HCT VFR BLD AUTO: 24.3 % (ref 36–52)
HGB BLD-MCNC: 7.9 G/DL (ref 12–18)
LYMPHOCYTES NFR BLD MANUAL: 22 % (ref 20–46)
MAGNESIUM SERPL-MCNC: 1.9 MG/DL (ref 1.8–2.4)
MCH RBC QN AUTO: 29 PG (ref 27–31)
MCHC RBC AUTO-ENTMCNC: 33 G/DL (ref 33–37)
MCV RBC AUTO: 89 FL (ref 80–94)
MONOCYTES NFR BLD MANUAL: 13 % (ref 5–12)
PHOSPHATE SERPL-MCNC: 4 MG/DL (ref 2.5–4.9)
PLATELET # BLD AUTO: 644 K/UL (ref 140–450)
POTASSIUM SERPL-SCNC: 3.5 MMOL/L (ref 3.5–5.1)
RBC # BLD AUTO: 2.74 MIL/UL (ref 4.2–6.1)
SODIUM SERPL-SCNC: 143 MMOL/L (ref 136–145)
WBC # BLD AUTO: 9.8 K/UL (ref 4.8–10.8)

## 2018-01-12 RX ADMIN — MINERAL SUPPLEMENT IRON 300 MG / 5 ML STRENGTH LIQUID 100 PER BOX UNFLAVORED SCH MG: at 21:15

## 2018-01-12 RX ADMIN — SODIUM CHLORIDE SCH MLS/HR: 9 INJECTION, SOLUTION INTRAVENOUS at 12:29

## 2018-01-12 RX ADMIN — MUPIROCIN SCH GM: 20 OINTMENT TOPICAL at 17:35

## 2018-01-12 RX ADMIN — Medication SCH DEV: at 21:42

## 2018-01-12 RX ADMIN — PSYLLIUM HUSK SCH GM: 3.4 POWDER ORAL at 09:31

## 2018-01-12 RX ADMIN — Medication SCH DEV: at 12:28

## 2018-01-12 RX ADMIN — SODIUM CHLORIDE SCH MLS/HR: 9 INJECTION, SOLUTION INTRAVENOUS at 21:15

## 2018-01-12 RX ADMIN — PANTOPRAZOLE SODIUM SCH MG: 40 INJECTION, POWDER, FOR SOLUTION INTRAVENOUS at 09:31

## 2018-01-12 RX ADMIN — Medication SCH EA: at 09:00

## 2018-01-12 RX ADMIN — VANCOMYCIN HYDROCHLORIDE SCH MLS/HR: 1 INJECTION, SOLUTION INTRAVENOUS at 17:34

## 2018-01-12 RX ADMIN — Medication SCH DEV: at 06:39

## 2018-01-12 RX ADMIN — MINERAL SUPPLEMENT IRON 300 MG / 5 ML STRENGTH LIQUID 100 PER BOX UNFLAVORED SCH MG: at 09:29

## 2018-01-12 RX ADMIN — LEVETIRACETAM SCH MG: 100 SOLUTION ORAL at 21:15

## 2018-01-12 RX ADMIN — Medication SCH MG: at 09:31

## 2018-01-12 RX ADMIN — SODIUM CHLORIDE SCH MLS/HR: 9 INJECTION, SOLUTION INTRAVENOUS at 04:09

## 2018-01-12 RX ADMIN — Medication SCH DEV: at 17:33

## 2018-01-12 RX ADMIN — LEVETIRACETAM SCH MG: 100 SOLUTION ORAL at 09:30

## 2018-01-12 RX ADMIN — CHLORHEXIDINE GLUCONATE SCH EA: 2 SOLUTION TOPICAL at 17:35

## 2018-01-12 RX ADMIN — Medication SCH MG: at 21:21

## 2018-01-12 RX ADMIN — IPRATROPIUM BROMIDE AND ALBUTEROL SULFATE SCH ML: .5; 3 SOLUTION RESPIRATORY (INHALATION) at 13:23

## 2018-01-12 RX ADMIN — PSYLLIUM HUSK SCH GM: 3.4 POWDER ORAL at 17:34

## 2018-01-12 RX ADMIN — Medication SCH EACH: at 09:30

## 2018-01-12 RX ADMIN — LISINOPRIL SCH MG: 10 TABLET ORAL at 09:30

## 2018-01-12 RX ADMIN — IPRATROPIUM BROMIDE AND ALBUTEROL SULFATE SCH ML: .5; 3 SOLUTION RESPIRATORY (INHALATION) at 19:32

## 2018-01-12 RX ADMIN — PSYLLIUM HUSK SCH GM: 3.4 POWDER ORAL at 12:29

## 2018-01-12 RX ADMIN — VANCOMYCIN HYDROCHLORIDE SCH MLS/HR: 1 INJECTION, SOLUTION INTRAVENOUS at 04:59

## 2018-01-12 NOTE — NUR
PATIENT RESTING IN BED WATCHING TV. PATIENT DENIES PAIN. FLACC 0. NO SIGNS OR SYMPTOMS OF 
ACUTE DISTRESS NOTED. CALL LIGHT WITHIN REACH. SAFETY MEASURES ENSURED. WILL CONTINUE TO 
MONITOR.

## 2018-01-12 NOTE — NUR
RECEIVED ON A GE CARESCAPE R860 VENTILATOR PLUGGED NOEL RED OUTLET TOLERATING WELL WITHOUT 
ADVERSE REACTIONS NOTED TO A PORTEX DCT #8 AIRWAY SECURED WITH A YAZ TRACH TIE CUFF 
PRESSURE CHECKED AS NOTED AMBU BAG AT HOB LOC ASLEEP RESTING COMFORTABLY BREATH SOUNDS CLEAR 
BILATERAL WITH GOOD CHEST RISE

## 2018-01-12 NOTE — NUR
1/12/18 

RD FOLLOW UP COMPLETED.

PLEASE REFER TO NUTRITION PROGRESS NOTE UNDER CARE ACTIVITY FOR ESTIMATED NUTRITION NEEDS.



1.CONTINUE CURRENT TUBE FEEDING



PT IS RECEIVING ENTERAL NUTRITION SUPPORT X 16 HRS/DAY.  THIS IS PROVIDING 1344 KCALS AND 67 
GM PRO/DAY.  TO MEET 100% ESTIMATED CALORIE AND PRO NEEDS/DAY--ADEQUATE. 



2. RD TO FOLLOW-UP IN 2-3 DAYS PATIENT IS HIGH RISK.



HAYLEY TAPIA RD

## 2018-01-12 NOTE — NUR
PATIENT GIVEN BED BATH. PATIENT HAD A BM. STOOL SOFT, MODERATE IN AMOUNT. PATIENT GIVEN 
PERINEAL CARE. PATIENT TURNED AND REPOSITIONED FOR COMFORT

## 2018-01-12 NOTE — NUR
AWAKE NO PULMONARY DISTRESS NOTED BREATH SOUNDS RHONCHI AT LEFT SIDE TO CLEAR AT RIGHT SIDE 
GOOD CHEST RISE DEEP TRACHEAL SUCTION FOR LARGE THIN YELLOW SECRETIONS AIRWAY PATENT

## 2018-01-12 NOTE — NUR
RECEIVED PATIENT REPORT AT BEDSIDE. PATIENT AWAKE AND ALERT BUT NONVERBAL. PATIENT TRACH TO 
VENT ON 28% FIO2. PATIENT ON CONTINUOUS O2 MONITORING. O2 % AT THIS TIME. NO S/S OF 
DISTRESS NOTED. LEE CATH IN PLACE, DRAINING YELLOW URINE. G-TUBE IN PLACE. IV LINE TO THE 
RIGHT HAND INTACT WITH IVF INFUSING WELL. BED LOWERED WITH CALL LIGHT WITHIN REACH. WILL 
CONTINUE TO MONITOR

## 2018-01-12 NOTE — NUR
RECEIVED HANDOFF REPORT FROM AM RN. PATIENT IS A&OX2. PATIENT TRANSFERRED TO WOUND CARE BED. 
PATIENT TOLERATED WELL. G TUBE IS PATENT AND INTACT. TRACH TO VENT PATIENT. PATENT AND 
INTACT. FIO2 28% PEEP 5 RATE 12/MIN. PATIENT DENIES PAIN, FLACC 0. NO SIGNS OR SYMPTOMS OF 
ACUTE DISTRESS NOTED. SACRAL DRESSING IS DRY AND INTACT. SAFETY MEASURES ENSURED. FAMILY AT 
BEDSIDE. WILL CONTINUE TO MONITOR.

## 2018-01-13 VITALS — SYSTOLIC BLOOD PRESSURE: 136 MMHG | DIASTOLIC BLOOD PRESSURE: 73 MMHG

## 2018-01-13 VITALS — DIASTOLIC BLOOD PRESSURE: 76 MMHG | SYSTOLIC BLOOD PRESSURE: 129 MMHG

## 2018-01-13 VITALS — DIASTOLIC BLOOD PRESSURE: 87 MMHG | SYSTOLIC BLOOD PRESSURE: 132 MMHG

## 2018-01-13 LAB
ANION GAP SERPL CALCULATED.3IONS-SCNC: 12.8 MMOL/L (ref 8–16)
BASOPHILS # BLD AUTO: 0.2 K/UL (ref 0–0.22)
BASOPHILS NFR BLD AUTO: 1.6 % (ref 0–2)
BUN SERPL-MCNC: 14 MG/DL (ref 7–18)
CHLORIDE SERPL-SCNC: 105 MMOL/L (ref 98–107)
CO2 SERPL-SCNC: 28.1 MMOL/L (ref 21–32)
CREAT SERPL-MCNC: 0.5 MG/DL (ref 0.7–1.3)
EOSINOPHIL # BLD AUTO: 0.1 K/UL (ref 0–0.4)
EOSINOPHIL NFR BLD AUTO: 1.2 % (ref 0–4)
ERYTHROCYTE [DISTWIDTH] IN BLOOD BY AUTOMATED COUNT: 13.6 % (ref 11.6–13.7)
GFR SERPL CREATININE-BSD FRML MDRD: 223 ML/MIN (ref 90–?)
GLUCOSE SERPL-MCNC: 121 MG/DL (ref 74–106)
HCT VFR BLD AUTO: 26.1 % (ref 36–52)
HGB BLD-MCNC: 8.4 G/DL (ref 12–18)
LYMPHOCYTES # BLD AUTO: 2 K/UL (ref 2–11.5)
LYMPHOCYTES NFR BLD AUTO: 17 % (ref 20.5–51.1)
MAGNESIUM SERPL-MCNC: 2 MG/DL (ref 1.8–2.4)
MCH RBC QN AUTO: 29 PG (ref 27–31)
MCHC RBC AUTO-ENTMCNC: 32 G/DL (ref 33–37)
MCV RBC AUTO: 88 FL (ref 80–94)
MONOCYTES # BLD AUTO: 0.6 K/UL (ref 0.8–1)
MONOCYTES NFR BLD AUTO: 5.2 % (ref 1.7–9.3)
NEUTROPHILS # BLD AUTO: 9.1 K/UL (ref 1.8–7.7)
NEUTROPHILS NFR BLD AUTO: 75 % (ref 42.2–75.2)
PHOSPHATE SERPL-MCNC: 3.4 MG/DL (ref 2.5–4.9)
PLATELET # BLD AUTO: 703 K/UL (ref 140–450)
POTASSIUM SERPL-SCNC: 3.9 MMOL/L (ref 3.5–5.1)
RBC # BLD AUTO: 2.95 MIL/UL (ref 4.2–6.1)
SODIUM SERPL-SCNC: 142 MMOL/L (ref 136–145)
WBC # BLD AUTO: 12 K/UL (ref 4.8–10.8)

## 2018-01-13 RX ADMIN — IPRATROPIUM BROMIDE AND ALBUTEROL SULFATE SCH ML: .5; 3 SOLUTION RESPIRATORY (INHALATION) at 13:44

## 2018-01-13 RX ADMIN — MINERAL SUPPLEMENT IRON 300 MG / 5 ML STRENGTH LIQUID 100 PER BOX UNFLAVORED SCH MG: at 09:00

## 2018-01-13 RX ADMIN — SODIUM CHLORIDE SCH MLS/HR: 9 INJECTION, SOLUTION INTRAVENOUS at 06:52

## 2018-01-13 RX ADMIN — PSYLLIUM HUSK SCH GM: 3.4 POWDER ORAL at 17:00

## 2018-01-13 RX ADMIN — MINERAL SUPPLEMENT IRON 300 MG / 5 ML STRENGTH LIQUID 100 PER BOX UNFLAVORED SCH MG: at 22:05

## 2018-01-13 RX ADMIN — VANCOMYCIN HYDROCHLORIDE SCH MLS/HR: 1 INJECTION, SOLUTION INTRAVENOUS at 05:00

## 2018-01-13 RX ADMIN — Medication SCH MG: at 22:06

## 2018-01-13 RX ADMIN — Medication SCH DEV: at 21:00

## 2018-01-13 RX ADMIN — LEVETIRACETAM SCH MG: 100 SOLUTION ORAL at 09:04

## 2018-01-13 RX ADMIN — PANTOPRAZOLE SODIUM SCH MG: 40 INJECTION, POWDER, FOR SOLUTION INTRAVENOUS at 09:03

## 2018-01-13 RX ADMIN — IPRATROPIUM BROMIDE AND ALBUTEROL SULFATE SCH ML: .5; 3 SOLUTION RESPIRATORY (INHALATION) at 08:01

## 2018-01-13 RX ADMIN — VANCOMYCIN HYDROCHLORIDE SCH MLS/HR: 1 INJECTION, SOLUTION INTRAVENOUS at 17:00

## 2018-01-13 RX ADMIN — ATORVASTATIN CALCIUM SCH MG: 20 TABLET, FILM COATED ORAL at 09:01

## 2018-01-13 RX ADMIN — Medication SCH DEV: at 11:30

## 2018-01-13 RX ADMIN — LEVETIRACETAM SCH MG: 100 SOLUTION ORAL at 22:05

## 2018-01-13 RX ADMIN — PSYLLIUM HUSK SCH GM: 3.4 POWDER ORAL at 13:17

## 2018-01-13 RX ADMIN — Medication SCH EA: at 09:06

## 2018-01-13 RX ADMIN — Medication SCH DEV: at 06:52

## 2018-01-13 RX ADMIN — CHLORHEXIDINE GLUCONATE SCH EA: 2 SOLUTION TOPICAL at 17:00

## 2018-01-13 RX ADMIN — SODIUM CHLORIDE SCH MLS/HR: 9 INJECTION, SOLUTION INTRAVENOUS at 21:00

## 2018-01-13 RX ADMIN — MUPIROCIN SCH GM: 20 OINTMENT TOPICAL at 17:00

## 2018-01-13 RX ADMIN — IPRATROPIUM BROMIDE AND ALBUTEROL SULFATE SCH ML: .5; 3 SOLUTION RESPIRATORY (INHALATION) at 20:16

## 2018-01-13 RX ADMIN — Medication SCH MG: at 09:04

## 2018-01-13 RX ADMIN — Medication SCH DEV: at 16:30

## 2018-01-13 RX ADMIN — SODIUM CHLORIDE SCH MLS/HR: 9 INJECTION, SOLUTION INTRAVENOUS at 13:17

## 2018-01-13 RX ADMIN — SODIUM CHLORIDE SCH MLS/HR: 9 INJECTION, SOLUTION INTRAVENOUS at 05:08

## 2018-01-13 RX ADMIN — Medication SCH EACH: at 09:02

## 2018-01-13 RX ADMIN — PSYLLIUM HUSK SCH GM: 3.4 POWDER ORAL at 09:03

## 2018-01-13 RX ADMIN — LISINOPRIL SCH MG: 10 TABLET ORAL at 09:01

## 2018-01-13 NOTE — NUR
RECEIVED TRACH PT ON VENT WITH A PORTEX 8. SETTINGS AC 12, , PEEP 5 AND FIO2 28%. PT 
IS IN BED EYES OPEN BUT IS NOT ALERT. TRACH IS SECURE WITH A PATENT AIRWAY. VENT IS PLUGGED 
INTO A RED OUTLET WITH ALARMS ON AND FUNCTIONING. PT IS NOT SOB AND NOT IN RESPIRATORY 
DISTRESS. WILL CONTINUE TO MONITOR.

## 2018-01-13 NOTE — NUR
BREATH SOUNDS COARSE; PT RR ELEVATED PER INTERACTION WITH WIFE AT BEDSIDE. NO DISTRESS NOTED 
AT THIS TIME

## 2018-01-13 NOTE — NUR
D/C LEE CATH. PATIENT TOELRATED WELL. NO ACUTE SIGNS OF DISTRESS. NEW LEE CATH INSERTED. 
16F. NO SIGNS OR DISTRESS NOTED. PATIENT TOLERATED WELL. SAFETY MEASURES ENSURED. WILL 
CONTINUE TO MONITOR.

## 2018-01-13 NOTE — NUR
MORNING ADL'S PERFORMED BY THE CNA. ASSISTED WITH TURNING AND PERFORMED WOUND CARE. PT 
TOLERATED WELL. WILL CONTINUE TO MONITOR PT.

## 2018-01-13 NOTE — NUR
ADMINISTERED MORNING MEDS. PT' GTUBE SITE WAS CHECKED FOR PLACEMENT, RESIDUAL (0), AND 
PATENCY. MEDS AND FLUSH TOTAL OF 360ML. PT TOLERATED WELL. CHANGED ALL SET UP INCLUDING 
SYRINGE. NEW TUBING FOR IVF. STARTED THE GTUBE FEEDING. PT TOLERATED WELL. NO SIGNS OF 
DISTRESS. WILL CONTINUE TO MONITOR PT.

## 2018-01-13 NOTE — NUR
FAMILY MEMBER STILL IN HERE VISITING. ABLE TO VERBALIZE HER /FAMILY MEMBER QUESTIONS ABOUT 
PT. CARE. ENCOURAGED TO ASK IF SHE HAS ANY QUESTIONS. NO COMPLAINTS DONE.

## 2018-01-13 NOTE — NUR
RECEIVED FROM AM RN IN BED TRACH TO VENT. 02 SAT AT 98 %. GT FEEDING OF DIABETISOURCE AT 
70ML/H AND TO STOP FEEDING AT 12 MN AND CONTINUE AT 8 AM TOMORROW.  BED BOUND AND ONLY MOVES 
HIS RIGHT ARM SLIGHTLY. TOTAL CARE. NEEDS WILL BE ANTICIPATED AND WILL BE MET. CALL LIGHT 
WITH IN REACH. FAMILY MEMBER IN HERE VISITING. ISOLATION PRECAUTION RT HISTORY OF MRSA 
NARES. WITH LEE CATHETER IN PLACE DRAINING WITH YELLOW COLORED URINE.

## 2018-01-13 NOTE — NUR
NEW IV STARTED. PT TOLERATED WELL. NO SIGNS OF DISTRESS. SAFETY MEASURES ENSURED. WILL 
CONTINUE TO MONITOR.

## 2018-01-13 NOTE — NUR
ADMINISTERED AFTERNOON MEDS. HELD VANCO D/T HIGH VANCO TROUGH FROM THIS MORNING. NOTIFIED 
CHARGE NURSE. WILL CONTINUE TO MONITOR PT.

## 2018-01-14 VITALS — SYSTOLIC BLOOD PRESSURE: 139 MMHG | DIASTOLIC BLOOD PRESSURE: 84 MMHG

## 2018-01-14 VITALS — DIASTOLIC BLOOD PRESSURE: 64 MMHG | SYSTOLIC BLOOD PRESSURE: 96 MMHG

## 2018-01-14 VITALS — SYSTOLIC BLOOD PRESSURE: 122 MMHG | DIASTOLIC BLOOD PRESSURE: 74 MMHG

## 2018-01-14 VITALS — SYSTOLIC BLOOD PRESSURE: 135 MMHG | DIASTOLIC BLOOD PRESSURE: 85 MMHG

## 2018-01-14 LAB
ANION GAP SERPL CALCULATED.3IONS-SCNC: 12.7 MMOL/L (ref 8–16)
BASOPHILS # BLD AUTO: 0.2 K/UL (ref 0–0.22)
BASOPHILS NFR BLD AUTO: 1.9 % (ref 0–2)
BUN SERPL-MCNC: 11 MG/DL (ref 7–18)
CHLORIDE SERPL-SCNC: 105 MMOL/L (ref 98–107)
CO2 SERPL-SCNC: 27 MMOL/L (ref 21–32)
CREAT SERPL-MCNC: 0.5 MG/DL (ref 0.7–1.3)
EOSINOPHIL # BLD AUTO: 0.1 K/UL (ref 0–0.4)
EOSINOPHIL NFR BLD AUTO: 1.7 % (ref 0–4)
ERYTHROCYTE [DISTWIDTH] IN BLOOD BY AUTOMATED COUNT: 13.9 % (ref 11.6–13.7)
GFR SERPL CREATININE-BSD FRML MDRD: 223 ML/MIN (ref 90–?)
GLUCOSE SERPL-MCNC: 105 MG/DL (ref 74–106)
HCT VFR BLD AUTO: 23.4 % (ref 36–52)
HGB BLD-MCNC: 7.6 G/DL (ref 12–18)
LYMPHOCYTES # BLD AUTO: 2.2 K/UL (ref 2–11.5)
LYMPHOCYTES NFR BLD AUTO: 25.8 % (ref 20.5–51.1)
MAGNESIUM SERPL-MCNC: 2 MG/DL (ref 1.8–2.4)
MCH RBC QN AUTO: 28 PG (ref 27–31)
MCHC RBC AUTO-ENTMCNC: 32 G/DL (ref 33–37)
MCV RBC AUTO: 88 FL (ref 80–94)
MONOCYTES # BLD AUTO: 0.6 K/UL (ref 0.8–1)
MONOCYTES NFR BLD AUTO: 7 % (ref 1.7–9.3)
NEUTROPHILS # BLD AUTO: 5.5 K/UL (ref 1.8–7.7)
NEUTROPHILS NFR BLD AUTO: 63.6 % (ref 42.2–75.2)
PHOSPHATE SERPL-MCNC: 3.2 MG/DL (ref 2.5–4.9)
PLATELET # BLD AUTO: 666 K/UL (ref 140–450)
POTASSIUM SERPL-SCNC: 3.7 MMOL/L (ref 3.5–5.1)
RBC # BLD AUTO: 2.67 MIL/UL (ref 4.2–6.1)
SODIUM SERPL-SCNC: 141 MMOL/L (ref 136–145)
WBC # BLD AUTO: 8.6 K/UL (ref 4.8–10.8)

## 2018-01-14 RX ADMIN — SODIUM CHLORIDE SCH MLS/HR: 9 INJECTION, SOLUTION INTRAVENOUS at 03:16

## 2018-01-14 RX ADMIN — VANCOMYCIN HYDROCHLORIDE SCH MLS/HR: 1 INJECTION, SOLUTION INTRAVENOUS at 16:28

## 2018-01-14 RX ADMIN — IPRATROPIUM BROMIDE AND ALBUTEROL SULFATE SCH ML: .5; 3 SOLUTION RESPIRATORY (INHALATION) at 13:00

## 2018-01-14 RX ADMIN — IPRATROPIUM BROMIDE AND ALBUTEROL SULFATE SCH ML: .5; 3 SOLUTION RESPIRATORY (INHALATION) at 18:58

## 2018-01-14 RX ADMIN — Medication SCH DEV: at 12:25

## 2018-01-14 RX ADMIN — LEVETIRACETAM SCH MG: 100 SOLUTION ORAL at 08:51

## 2018-01-14 RX ADMIN — ATORVASTATIN CALCIUM SCH MG: 20 TABLET, FILM COATED ORAL at 08:50

## 2018-01-14 RX ADMIN — SODIUM CHLORIDE SCH MLS/HR: 9 INJECTION, SOLUTION INTRAVENOUS at 06:17

## 2018-01-14 RX ADMIN — IPRATROPIUM BROMIDE AND ALBUTEROL SULFATE SCH ML: .5; 3 SOLUTION RESPIRATORY (INHALATION) at 08:36

## 2018-01-14 RX ADMIN — SODIUM CHLORIDE SCH MLS/HR: 9 INJECTION, SOLUTION INTRAVENOUS at 05:00

## 2018-01-14 RX ADMIN — Medication SCH DEV: at 20:25

## 2018-01-14 RX ADMIN — Medication SCH DEV: at 06:19

## 2018-01-14 RX ADMIN — PSYLLIUM HUSK SCH GM: 3.4 POWDER ORAL at 12:26

## 2018-01-14 RX ADMIN — SODIUM CHLORIDE SCH MLS/HR: 9 INJECTION, SOLUTION INTRAVENOUS at 20:26

## 2018-01-14 RX ADMIN — MUPIROCIN SCH GM: 20 OINTMENT TOPICAL at 16:32

## 2018-01-14 RX ADMIN — Medication SCH DEV: at 16:27

## 2018-01-14 RX ADMIN — Medication SCH EACH: at 08:50

## 2018-01-14 RX ADMIN — MINERAL SUPPLEMENT IRON 300 MG / 5 ML STRENGTH LIQUID 100 PER BOX UNFLAVORED SCH MG: at 20:25

## 2018-01-14 RX ADMIN — MINERAL SUPPLEMENT IRON 300 MG / 5 ML STRENGTH LIQUID 100 PER BOX UNFLAVORED SCH MG: at 08:51

## 2018-01-14 RX ADMIN — Medication SCH MG: at 20:26

## 2018-01-14 RX ADMIN — Medication SCH EA: at 09:00

## 2018-01-14 RX ADMIN — PSYLLIUM HUSK SCH GM: 3.4 POWDER ORAL at 08:50

## 2018-01-14 RX ADMIN — VANCOMYCIN HYDROCHLORIDE SCH MLS/HR: 1 INJECTION, SOLUTION INTRAVENOUS at 04:38

## 2018-01-14 RX ADMIN — LEVETIRACETAM SCH MG: 100 SOLUTION ORAL at 20:25

## 2018-01-14 RX ADMIN — SODIUM CHLORIDE SCH MLS/HR: 9 INJECTION, SOLUTION INTRAVENOUS at 12:27

## 2018-01-14 RX ADMIN — PSYLLIUM HUSK SCH GM: 3.4 POWDER ORAL at 16:32

## 2018-01-14 RX ADMIN — Medication SCH MG: at 08:51

## 2018-01-14 RX ADMIN — CHLORHEXIDINE GLUCONATE SCH EA: 2 SOLUTION TOPICAL at 16:32

## 2018-01-14 RX ADMIN — LISINOPRIL SCH MG: 10 TABLET ORAL at 08:49

## 2018-01-14 RX ADMIN — PANTOPRAZOLE SODIUM SCH MG: 40 INJECTION, POWDER, FOR SOLUTION INTRAVENOUS at 08:51

## 2018-01-14 NOTE — NUR
TURNED TO SIDES WITH HOB UP 30 DEGREES FOR ASPIRATION PRECAUTIONS. NO PROJECTILE VOMTING.  
NO RESTLESSNESS. TELEMETRY MONITORING. NEEDS WILL BE ANTICIPATED AND WILL BE MET.

## 2018-01-14 NOTE — NUR
PATIENT'S FAMILY PRESENT AT BEDSIDE. PATIENT TURNED AND REPOSITIONED FOR COMFORT. PATIENT 
TOLERATED WELL

## 2018-01-14 NOTE — NUR
PT. TURNED TO SIDES Q 2H. RESPIRATORY THERAPIST IN HERE AND SUCTIONED PT. ISOLATION 
PRECAUTION  RT MRSA NARES. TOTAL CARE. NO RESTLESSNESS AT THIS TIME. NEEDS WILL BE 
ANTICIPATED AND WILL BE MET. TELEMETRY MONITORING.

## 2018-01-14 NOTE — NUR
AM PERSONAL HYGIENE RENDERED BY CNAS. TURNED TO SIDES Q 2H. TOTAL CARE. APHASIC. LEE 
CATHETER DRAINING WELL WITH YELLOW URINE TO URINAL BAG. GT INTACT AND IN PLACE. HOB UP 30 
DEGREES FOR ASPIRATION PRECAUTION. NO  PROJECTILE VOMITING OR COUGHING NOTED IN MY SHIFT. 
IVF SITE TO RIGHT FOREARM INTACT AND NO INFILTRATION . SUCTIONED BY RESPIRATORY THERAPIST 
AND ME PRN.

## 2018-01-14 NOTE — NUR
TURNED TO SIDE BY CNAS. TOTAL CARE. NEEDS WILL BE ANTICIPATED AND WILL BE MET. PT. ISOLATION 
PRECAUTIONS. 02  %. TRACH TO VENT. SUCTIONED PRN.

## 2018-01-14 NOTE — NUR
RECEIVED PATIENT REPORT AT BEDSIDE. PATIENT AWAKE AND ALERT BUT NONVERBAL. PATIENT TRACH TO 
VENT ON 28% FIO2. PATIENT ON CONTINUOUS O2 MONITORING. O2 % AT THIS TIME. NO S/S OF 
DISTRESS NOTED. LEE CATH IN PLACE, DRAINING YELLOW URINE. G-TUBE IN PLACE. IV LINE TO THE 
RIGHT FOREARM INTACT WITH IVF INFUSING WELL. BED LOWERED WITH CALL LIGHT WITHIN REACH. WILL 
CONTINUE TO MONITOR

## 2018-01-14 NOTE — NUR
SLEEPING IN AND OUT. WAKES UP WHEN TOUCHED. FOLLOWS CARE GIVERS WITH EYES. NEEDS WILL BE 
ANTICIPATED AND MET.

## 2018-01-14 NOTE — NUR
RECEIVED FROM AM RN IN BED WITH WIFE AT BEDSIDE. VENT TO TRACH. NO RESTLESSNESS NOTED. PT. 
IS AWAKE AND FOLLOWS CARE GIVERS WITH HIS EYES. APHASIC. LEE CATHETER IN PLACE DRAINING 
WELL WITH YELLOW URINE. GT IN PLACE AND HOB UP 30 DEGREES FOR ASPIRATION PRECAUTION.

## 2018-01-15 VITALS — DIASTOLIC BLOOD PRESSURE: 70 MMHG | SYSTOLIC BLOOD PRESSURE: 127 MMHG

## 2018-01-15 VITALS — DIASTOLIC BLOOD PRESSURE: 87 MMHG | SYSTOLIC BLOOD PRESSURE: 145 MMHG

## 2018-01-15 VITALS — DIASTOLIC BLOOD PRESSURE: 60 MMHG | SYSTOLIC BLOOD PRESSURE: 100 MMHG

## 2018-01-15 VITALS — SYSTOLIC BLOOD PRESSURE: 152 MMHG | DIASTOLIC BLOOD PRESSURE: 87 MMHG

## 2018-01-15 VITALS — DIASTOLIC BLOOD PRESSURE: 77 MMHG | SYSTOLIC BLOOD PRESSURE: 139 MMHG

## 2018-01-15 LAB
ANION GAP SERPL CALCULATED.3IONS-SCNC: 12.3 MMOL/L (ref 8–16)
BASOPHILS # BLD AUTO: 0.2 K/UL (ref 0–0.22)
BASOPHILS NFR BLD AUTO: 2 % (ref 0–2)
BUN SERPL-MCNC: 12 MG/DL (ref 7–18)
CHLORIDE SERPL-SCNC: 106 MMOL/L (ref 98–107)
CO2 SERPL-SCNC: 27.4 MMOL/L (ref 21–32)
CREAT SERPL-MCNC: 0.6 MG/DL (ref 0.7–1.3)
EOSINOPHIL # BLD AUTO: 0.1 K/UL (ref 0–0.4)
EOSINOPHIL NFR BLD AUTO: 1.4 % (ref 0–4)
ERYTHROCYTE [DISTWIDTH] IN BLOOD BY AUTOMATED COUNT: 13.6 % (ref 11.6–13.7)
GFR SERPL CREATININE-BSD FRML MDRD: 181 ML/MIN (ref 90–?)
GLUCOSE SERPL-MCNC: 115 MG/DL (ref 74–106)
HCT VFR BLD AUTO: 23.3 % (ref 36–52)
HGB BLD-MCNC: 7.8 G/DL (ref 12–18)
LYMPHOCYTES # BLD AUTO: 2.3 K/UL (ref 2–11.5)
LYMPHOCYTES NFR BLD AUTO: 27.7 % (ref 20.5–51.1)
MAGNESIUM SERPL-MCNC: 2 MG/DL (ref 1.8–2.4)
MCH RBC QN AUTO: 29 PG (ref 27–31)
MCHC RBC AUTO-ENTMCNC: 33 G/DL (ref 33–37)
MCV RBC AUTO: 87 FL (ref 80–94)
MONOCYTES # BLD AUTO: 0.7 K/UL (ref 0.8–1)
MONOCYTES NFR BLD AUTO: 8.2 % (ref 1.7–9.3)
NEUTROPHILS # BLD AUTO: 5.2 K/UL (ref 1.8–7.7)
NEUTROPHILS NFR BLD AUTO: 60.7 % (ref 42.2–75.2)
PHOSPHATE SERPL-MCNC: 3.3 MG/DL (ref 2.5–4.9)
PLATELET # BLD AUTO: 694 K/UL (ref 140–450)
POTASSIUM SERPL-SCNC: 3.7 MMOL/L (ref 3.5–5.1)
RBC # BLD AUTO: 2.67 MIL/UL (ref 4.2–6.1)
SODIUM SERPL-SCNC: 142 MMOL/L (ref 136–145)
WBC # BLD AUTO: 8.5 K/UL (ref 4.8–10.8)

## 2018-01-15 RX ADMIN — Medication SCH DEV: at 06:30

## 2018-01-15 RX ADMIN — IPRATROPIUM BROMIDE AND ALBUTEROL SULFATE SCH ML: .5; 3 SOLUTION RESPIRATORY (INHALATION) at 20:05

## 2018-01-15 RX ADMIN — SODIUM CHLORIDE SCH MLS/HR: 9 INJECTION, SOLUTION INTRAVENOUS at 04:49

## 2018-01-15 RX ADMIN — Medication SCH DEV: at 12:07

## 2018-01-15 RX ADMIN — Medication SCH DEV: at 17:13

## 2018-01-15 RX ADMIN — MINERAL SUPPLEMENT IRON 300 MG / 5 ML STRENGTH LIQUID 100 PER BOX UNFLAVORED SCH MG: at 20:38

## 2018-01-15 RX ADMIN — Medication SCH EA: at 09:00

## 2018-01-15 RX ADMIN — PSYLLIUM HUSK SCH GM: 3.4 POWDER ORAL at 08:59

## 2018-01-15 RX ADMIN — MINERAL SUPPLEMENT IRON 300 MG / 5 ML STRENGTH LIQUID 100 PER BOX UNFLAVORED SCH MG: at 08:59

## 2018-01-15 RX ADMIN — SODIUM CHLORIDE SCH MLS/HR: 9 INJECTION, SOLUTION INTRAVENOUS at 04:48

## 2018-01-15 RX ADMIN — IPRATROPIUM BROMIDE AND ALBUTEROL SULFATE SCH ML: .5; 3 SOLUTION RESPIRATORY (INHALATION) at 07:57

## 2018-01-15 RX ADMIN — VANCOMYCIN HYDROCHLORIDE SCH MLS/HR: 1 INJECTION, SOLUTION INTRAVENOUS at 04:48

## 2018-01-15 RX ADMIN — Medication SCH MG: at 09:00

## 2018-01-15 RX ADMIN — PANTOPRAZOLE SODIUM SCH MG: 40 INJECTION, POWDER, FOR SOLUTION INTRAVENOUS at 09:00

## 2018-01-15 RX ADMIN — Medication SCH DEV: at 20:38

## 2018-01-15 RX ADMIN — LEVETIRACETAM SCH MG: 100 SOLUTION ORAL at 20:38

## 2018-01-15 RX ADMIN — Medication SCH EACH: at 08:58

## 2018-01-15 RX ADMIN — LEVETIRACETAM SCH MG: 100 SOLUTION ORAL at 08:59

## 2018-01-15 RX ADMIN — Medication SCH MG: at 20:38

## 2018-01-15 RX ADMIN — SODIUM CHLORIDE SCH MLS/HR: 9 INJECTION, SOLUTION INTRAVENOUS at 20:39

## 2018-01-15 RX ADMIN — IPRATROPIUM BROMIDE AND ALBUTEROL SULFATE SCH ML: .5; 3 SOLUTION RESPIRATORY (INHALATION) at 13:30

## 2018-01-15 RX ADMIN — VANCOMYCIN HYDROCHLORIDE SCH MLS/HR: 1 INJECTION, SOLUTION INTRAVENOUS at 16:48

## 2018-01-15 RX ADMIN — ATORVASTATIN CALCIUM SCH MG: 20 TABLET, FILM COATED ORAL at 09:00

## 2018-01-15 RX ADMIN — PSYLLIUM HUSK SCH GM: 3.4 POWDER ORAL at 12:08

## 2018-01-15 RX ADMIN — PSYLLIUM HUSK SCH GM: 3.4 POWDER ORAL at 16:48

## 2018-01-15 RX ADMIN — LISINOPRIL SCH MG: 10 TABLET ORAL at 09:00

## 2018-01-15 RX ADMIN — SODIUM CHLORIDE SCH MLS/HR: 9 INJECTION, SOLUTION INTRAVENOUS at 12:08

## 2018-01-15 NOTE — NUR
RECEIVED PT IN STABLE CONDITION FROM AM NURSE. PT ON TELE MONITOR.  AWAKE BUT APHASIC. NO 
ACUTE RESPIRATORY DISTRESS NOTED. ON TRACH TO VENT. O2 %. BEDREST. WITH LT SIDE 
FLACCID. HAS IVF INFUSING WELL ON THE LT FA #[id number]. CLEAR AND PATENT. NO REDNESS NOTED ON THE 
IV SITE. GT FEEDING TOLERATING WELL. NO RESIDUAL NOTED. HEAD OF BED ELEVATED. LEE CATHETER 
DRAINING TO CLEAR YELLOW URINE. BOTH LOWER LEG/FEET SWOLLEN. ELEVATED ON PILLOW. BED ON LOW 
POSITION. FREQUENT ROUNDS NEEDED . ON CONTACT ISOLATION FOR MRSA NARES. WILL CONTINUE TO 
MONITOR.

## 2018-01-15 NOTE — NUR
PT. TURNED Q 2H. TOTAL CARE. SUCTIONED BY RESPIRATORY THERAPIST AND NURSE PRN. SCANT WHITISH 
MUCUS SUCTIONED MOST OF TIMES. AFEBRILE. FEEDING STOPPED 12 MIDNIGHT AND TO START AGAIN AT 
0800 IN A.M. AS ORDERED.

## 2018-01-15 NOTE — NUR
PATIENT REPOSITIONED ON RIGHT LATERAL SIDE.  PATIENT SHOWS NO SIGNS OF RESPIRATORY DISTRESS. 
 WILL CONTINUE TO MONITOR PATIENT.

## 2018-01-15 NOTE — NUR
GAVE REPORT TO NIGHTSHIFT NURSE AT BEDSIDE. PATIENT SHOWS NO SIGNS OF RESPIRATORY DISTRESS.  
PATIENT LEE CATHETER DRAINING WELL. PATIENT G-TUBE IN PLACE AND INTACT. PATIENT IS IN 
STABLE CONDITION.

## 2018-01-15 NOTE — NUR
CM NOTE

FAXED CLINICAL UPDATE AND MICROBIOLOGY RESULTS TO [facility] [phone].  PER JESSE OF [facility] [place] PH# 
[phone], PATIENT CAN GO TO RM [address] A UNDER DR. DOMINGUEZ IF MRSA OF SPUTUM IS COLONIZED 
WHEN PATIENT IS READY FOR DISCHARGE.  DR. AGUIRRE AWARE.

## 2018-01-15 NOTE — NUR
PT REMAINS ON DOCUMENTED SETTINGS NO CHANGES MADE TO VENT. ALARMS FOR VENTILATOR REMAIN ON 
AND FUNCTIONING. AREA AROUND STOMA CLEANED OF SLIGHT DRAINAGE. PT IS AWAKE NOT ALERT BUT IS 
NOT IN RESPIRATORY DISTRESS. TRACH REMAINS SECURE. PT SUCTIONED OBTAINED SMALL AMOUNT OF 
THIN WHITE SECRETIONS, AIRWAY IS PATENT.

## 2018-01-15 NOTE — NUR
RECEIVED PATIENT REPORT AT BEDSIDE. PATIENT IS ASLEEP BUT AROUSABLE TO NAME. PATIENT TRACH 
TO VENT ON 28% FIO2. PATIENT ON CONTINUOUS PULSE OXIMETER MONITORING.  PATIENT SATURATION IS 
AT 99% AT THIS TIME.  PATIENT SHOWS NO SIGNS OF RESPIRATORY DISTRESS. LEE CATHETER IS IN 
PLACE AND DRAINING CLEAR YELLOW URINE INTO LEE BAG. G-TUBE IS IN PLACE.  PATIENT IS IN 
STABLE CONDITION.  BED LOWERED AND CALL LIGHT WITHIN REACH. WILL CONTINUE TO MONITOR 
PATIENT.

## 2018-01-15 NOTE — NUR
RECEIVED TRACH PT ON VENT WITH A PORTEX 8. SETTINGS AC 12, , PEEP 5 AND FIO2 28%. PT 
IS IN BED EYES OPEN BUT IS NOT ALERT. TRACH IS SECURE WITH A PATENT AIRWAY. VENT IS PLUGGED 
INTO A RED OUTLET WITH ALARMS ON AND FUNCTIONING. PT IS NOT SOB AND NOT IN RESPIRATORY 
DISTRESS. PT SUCTIONED OBTAINED SMALL AMOUNT OF WHITE SECRETIONS. WILL CONTINUE TO MONITOR

## 2018-01-15 NOTE — NUR
PATIENT IS ASLEEP AT THIS TIME.  PATIENT SHOWS NO SIGNS OF RESPIRATORY DISTRESS.  WILL 
CONTINUE TO MONITOR PATIENT.

## 2018-01-15 NOTE — NUR
PT. KEPT COMFORTABLE AND CLEAN. AM HYGIENE CARE RENDERED BY CNAS. TURNED TO SIDES Q 2H. 
TOTAL CARE. NEEDS ANTICIPATED AND MET. NO NOTED VOTING. IVF SITE NO INFILTRATIONNOTED.

## 2018-01-15 NOTE — NUR
I contact Patient's Wife Marilee Cole (Indonesian Speaking Only) to discuss and gather 
patient's information.  Mrs. Cole was not available and did not respond my call.  I left 
her a voice mail MGS and request to call me back as soon as possible.

## 2018-01-15 NOTE — NUR
Patient's Daughter Sujatha [phone] call me to discuss Patient's status, and requested 
information in regards other options for Skilled Nursing Facilities around [facility].  I 
discuss and provided updates about patient status, care and discharge to daughter Sujatha.  
She stated that she and mother are happy with care that Patient is receiving at current 
Facility [facility] yet is looking for other alternatives due to her mother living in [facility] 
and driving currently almost every day to see Patient.  I provided family with information 
and resources as well discuss challenging search for facilities that do take [employer] for 
the level of care patient receives and agreed to provide a list of facilities that family 
can utilized.  Sujatha agreed to get resources and stated that at this time she and mother 
will like Patient to return to current facility [facility] when able and ready for discharge; she 
added "We rather him be in a place he is familiar for now and with time search for options 
in our area" for facilities around her mother's home.  Stated that she and mother do not 
want to make too many changes at this time may be until Patient has made more progress in 
the future.  She thank me for information and ended call.

## 2018-01-15 NOTE — NUR
RECEIVED PT STABLE ON VENT SUPPORT AT DOCUMENTED SETTINGS, SUCTIONED SCANT CLEAR THIN 
SECRETIONS, HHN TX GIVEN, TOLERATED WELL, NO RESP DISTRESS OR SOB NOTED AT THIS TIME, PORTEX 
8 TRACH SECURED/MIDLINE/PATENT, FAMILY AT BEDSIDE, ALARMS SET AND AUDIBLE, AMBU BAG AT 
BEDSIDE, VENT PLUGGED INTO RED OUTLET, WILL CONT TO MONITOR.

## 2018-01-16 VITALS — DIASTOLIC BLOOD PRESSURE: 75 MMHG | SYSTOLIC BLOOD PRESSURE: 123 MMHG

## 2018-01-16 VITALS — DIASTOLIC BLOOD PRESSURE: 91 MMHG | SYSTOLIC BLOOD PRESSURE: 162 MMHG

## 2018-01-16 VITALS — SYSTOLIC BLOOD PRESSURE: 141 MMHG | DIASTOLIC BLOOD PRESSURE: 90 MMHG

## 2018-01-16 LAB
ANION GAP SERPL CALCULATED.3IONS-SCNC: 11.9 MMOL/L (ref 8–16)
BASOPHILS # BLD AUTO: 0.2 K/UL (ref 0–0.22)
BASOPHILS NFR BLD AUTO: 2.3 % (ref 0–2)
BUN SERPL-MCNC: 10 MG/DL (ref 7–18)
CHLORIDE SERPL-SCNC: 105 MMOL/L (ref 98–107)
CO2 SERPL-SCNC: 29 MMOL/L (ref 21–32)
CREAT SERPL-MCNC: 0.5 MG/DL (ref 0.7–1.3)
EOSINOPHIL # BLD AUTO: 0.1 K/UL (ref 0–0.4)
EOSINOPHIL NFR BLD AUTO: 1.6 % (ref 0–4)
ERYTHROCYTE [DISTWIDTH] IN BLOOD BY AUTOMATED COUNT: 13.6 % (ref 11.6–13.7)
GFR SERPL CREATININE-BSD FRML MDRD: 223 ML/MIN (ref 90–?)
GLUCOSE SERPL-MCNC: 115 MG/DL (ref 74–106)
HCT VFR BLD AUTO: 24.9 % (ref 36–52)
HGB BLD-MCNC: 8 G/DL (ref 12–18)
LYMPHOCYTES # BLD AUTO: 2.4 K/UL (ref 2–11.5)
LYMPHOCYTES NFR BLD AUTO: 26.2 % (ref 20.5–51.1)
MAGNESIUM SERPL-MCNC: 1.9 MG/DL (ref 1.8–2.4)
MCH RBC QN AUTO: 28 PG (ref 27–31)
MCHC RBC AUTO-ENTMCNC: 32 G/DL (ref 33–37)
MCV RBC AUTO: 88 FL (ref 80–94)
MONOCYTES # BLD AUTO: 0.6 K/UL (ref 0.8–1)
MONOCYTES NFR BLD AUTO: 6.5 % (ref 1.7–9.3)
NEUTROPHILS # BLD AUTO: 5.8 K/UL (ref 1.8–7.7)
NEUTROPHILS NFR BLD AUTO: 63.4 % (ref 42.2–75.2)
PHOSPHATE SERPL-MCNC: 3.3 MG/DL (ref 2.5–4.9)
PLATELET # BLD AUTO: 697 K/UL (ref 140–450)
POTASSIUM SERPL-SCNC: 3.9 MMOL/L (ref 3.5–5.1)
RBC # BLD AUTO: 2.83 MIL/UL (ref 4.2–6.1)
SODIUM SERPL-SCNC: 142 MMOL/L (ref 136–145)
WBC # BLD AUTO: 9.1 K/UL (ref 4.8–10.8)

## 2018-01-16 RX ADMIN — MINERAL SUPPLEMENT IRON 300 MG / 5 ML STRENGTH LIQUID 100 PER BOX UNFLAVORED SCH MG: at 20:41

## 2018-01-16 RX ADMIN — IPRATROPIUM BROMIDE AND ALBUTEROL SULFATE SCH ML: .5; 3 SOLUTION RESPIRATORY (INHALATION) at 12:45

## 2018-01-16 RX ADMIN — LEVETIRACETAM SCH MG: 100 SOLUTION ORAL at 09:29

## 2018-01-16 RX ADMIN — PANTOPRAZOLE SODIUM SCH MG: 40 INJECTION, POWDER, FOR SOLUTION INTRAVENOUS at 09:29

## 2018-01-16 RX ADMIN — PSYLLIUM HUSK SCH GM: 3.4 POWDER ORAL at 13:00

## 2018-01-16 RX ADMIN — Medication SCH EA: at 09:00

## 2018-01-16 RX ADMIN — IPRATROPIUM BROMIDE AND ALBUTEROL SULFATE SCH ML: .5; 3 SOLUTION RESPIRATORY (INHALATION) at 20:10

## 2018-01-16 RX ADMIN — LINEZOLID SCH MLS/HR: 600 INJECTION, SOLUTION INTRAVENOUS at 09:49

## 2018-01-16 RX ADMIN — Medication SCH DEV: at 16:30

## 2018-01-16 RX ADMIN — PSYLLIUM HUSK SCH GM: 3.4 POWDER ORAL at 17:00

## 2018-01-16 RX ADMIN — IPRATROPIUM BROMIDE AND ALBUTEROL SULFATE SCH ML: .5; 3 SOLUTION RESPIRATORY (INHALATION) at 07:27

## 2018-01-16 RX ADMIN — MINERAL SUPPLEMENT IRON 300 MG / 5 ML STRENGTH LIQUID 100 PER BOX UNFLAVORED SCH MG: at 09:27

## 2018-01-16 RX ADMIN — Medication SCH DEV: at 11:30

## 2018-01-16 RX ADMIN — ATORVASTATIN CALCIUM SCH MG: 20 TABLET, FILM COATED ORAL at 09:28

## 2018-01-16 RX ADMIN — LISINOPRIL SCH MG: 10 TABLET ORAL at 09:28

## 2018-01-16 RX ADMIN — Medication SCH DEV: at 06:03

## 2018-01-16 RX ADMIN — Medication SCH EACH: at 09:29

## 2018-01-16 RX ADMIN — LEVETIRACETAM SCH MG: 100 SOLUTION ORAL at 20:42

## 2018-01-16 RX ADMIN — LINEZOLID SCH MLS/HR: 600 INJECTION, SOLUTION INTRAVENOUS at 20:42

## 2018-01-16 RX ADMIN — Medication SCH DEV: at 20:53

## 2018-01-16 RX ADMIN — SODIUM CHLORIDE SCH MLS/HR: 9 INJECTION, SOLUTION INTRAVENOUS at 15:16

## 2018-01-16 RX ADMIN — Medication SCH MG: at 09:28

## 2018-01-16 RX ADMIN — Medication SCH MG: at 20:42

## 2018-01-16 RX ADMIN — PSYLLIUM HUSK SCH GM: 3.4 POWDER ORAL at 09:27

## 2018-01-16 NOTE — NUR
RECEIVED PT IN STABLE CONDITION FROM AM NURSE.MED SURG PT.  PT IS ASLEEP WITH NO RESPIRATORY 
DISTRESS NOTED. ON TRACH TO VENT. O2 %. BEDBOUND. WITH FAMILY AT BEDSIDE. HAS IVF 
INFUSING WELL ON THE RT FA#[id number]. CLEAR AND PATENT. LEE CATHETER TO GRAVITY. GT FEEDING 
TOLERATING WELL.  BED ON LOW POSITION, FREQUENT ROUNDS NEEDED. CALL LIGHT PLACED WITHIN 
REACH. WILL CONTINUE TO MONITOR.

## 2018-01-16 NOTE — NUR
MADE ROUNDS.  JUST REPOSITIONED FOR COMFORT. SUCTIONED PT WITH CREAMY SECRETIONS IN SMALL 
AMOUNT . NO SOB NOTED.

## 2018-01-16 NOTE — NUR
REPOSITIONED PATIENT ON LEFT LATERAL SIDE.  PATIENT TOLERATED WELL.  WILL CONTINUE TO 
MONITOR PATIENT.

## 2018-01-16 NOTE — NUR
RECEIVED PT ON CARESCAPE ON A/C 12  PEEP5 FIO2 28 ALARMS ARE ON AND FUNCTIONAL BMV HOB 
PTS TRACH PORTEX 8 IS SECURE PT IN HF QUIET BS RHONCI HHN GIVEN I\L WITH 3 MG DUONEB CUFF 
PRESSURE 26 CM H20 NO APPARENT DISTRESS VENT PLUGGED INTO RED OUTLET

-------------------------------------------------------------------------------

Addendum: 01/16/18 at 1718 by Loida Salcedo RT

-------------------------------------------------------------------------------

CONT. POX IN PLACE

## 2018-01-16 NOTE — NUR
RECEIVED PATIENT REPORT AT BEDSIDE FROM NIGHTSHIFT NURSE. PATIENT IS ASLEEP BUT AROUSABLE TO 
NAME. PATIENT TRACH TO VENT ON 28% FIO2. PATIENT ON CONTINUOUS PULSE OXIMETER MONITORING.  
PATIENT SATURATION IS % AT THIS TIME.  PATIENT SHOWS NO SIGNS OF RESPIRATORY DISTRESS. 
LEE CATHETER IS IN PLACE AND DRAINING CLEAR YELLOW URINE INTO LEE BAG. G-TUBE IS IN 
PLACE.  PATIENT IS IN STABLE CONDITION.  BED LOWERED AND CALL LIGHT WITHIN REACH. WILL 
CONTINUE TO MONITOR PATIENT.

## 2018-01-16 NOTE — NUR
1/16/18 

RD FOLLOW UP COMPLETED.

PLEASE REFER TO NUTRITION PROGRESS NOTE UNDER CARE ACTIVITY FOR ESTIMATED NUTRITION NEEDS.



1.CONTINUE CURRENT TUBE FEEDING.  PT IS RECEIVING ENTERAL NUTRITION SUPPORT X 16 HRS/DAY.  
THIS IS PROVIDING 1344 KCALS AND 67 GM PRO/DAY.  TO MEET 100% ESTIMATED CALORIE AND PRO 
NEEDS/DAY--ADEQUATE. 



2.RD TO FOLLOW-UP IN 2-3 DAYS PATIENT IS HIGH RISK.





HAYLEY TAPIA RD

## 2018-01-16 NOTE — NUR
MADE ROUNDS. HEARD THE VENT ALARMING. FOUND PT TRYING TO REACH THE TRACH WITH RT HAND WITH 
MITTENS. WITH SOME CRACKLES. SUCTIONED TRACH. WITH CREAMY SMALL SECRETIONS OBTAINED. PT NOW 
IS OK. WILL CONTINUE TO MONITOR.

## 2018-01-16 NOTE — NUR
PATIENT IS ASLEEP AT THIS TIME.  PATIENT IS AROUSABLE AND IS ALERT.  NO SIGNS OF SOB OR 
RESPIRATORY DISTRESS.  WILL CONTINUE TO MONITOR PATIENT.

## 2018-01-16 NOTE — NUR
WOUND CARE EVALUATION NOTES:

REASON FOR EVALUATION:  LOW ANGELA SCORE

COMPLETE SKIN ASSESSMENT DONE ON THIS 53 Y/O MALE PATIENT FROM [facility] TO [facility], 
WITH INITIAL DIAGNOSIS OF ALTER MENTAL STATUS.  PAST MEDICAL HISTORY INCLUDE S/P RIGHT 
CRANIOTOMY, DRUG ABUSE AND ALCOHOL ABUSE.  ALL ABOVE INFORMATION OBTAINED FROM THE ADMISSION 
H&P.  LABS ARE WBC 9.1, H/H 8.0/24.9, GLUCOSE 115, ALBUMIN 2.2, PT/INR 10.7/1.1 AND PTT 
31.2.  CURRENT MEDS INCLUDE CEFTAZIDIME, ATORVASTATIN, INSULIN AND METOPROLOL.  PATIENT IS 
AWAKE,  SKIN WARM TO TOUCH , THICKENED TOENAILS, NO EDEMA, NO HAIR GROWTH AND BILATERAL 
PEDAL PULSES PRESENT.TRACH WITH VENT, FOLY CATHETER PATENT AND INTACT TO KAR COLORED URINE 
IN SMALL AMOUNT.  NEEDS ASSISTANCE IN TURNING.  PLAN OF CARE AND PRESSURE PREVENTIVE 
MEASURES DISCUSSED WITH PRIMARY NURSE.



INTEGUMENTARY:

-TRACH, MARY-STOMA SKIN DRY AND INTACT

-LUQ ABDOMINAL GT STOMA SITE MARY-SKIN DRY AND INTACT

-SACRALCOCCYX 100 % RE-EPITHELIUM TISSUE, PINK IN COLOR

-BILATERAL HEELS BLANCHABLE REDNESS





RECOMMENDATIONS:

-CLEANSE COCCYX WITH SOAP AND WATER, PAT DRY, APPLY OPTIFORM Q 5 DAY AND PRN IF SOILING

-TURN AND REPOSITION PATIENT Q2H TO LEFT AND RIGHT SIDE ONLY TO OFFLOAD SACRALCOCCYX

-ASSESS AND MONITOR SKIN CONDITION DURING POSITION CHANGE

-OFFLOAD BILATERAL HEELS BY PLACING PILLOWS UNDER CALVES AT ALL TIMES, UNLESS OTHERWISE 
CONTRAINDICATED

-PRESSURE REDISTRIBUTION SURFACE THERAPY

-KEEP SKIN CLEAN AND DRY AT ALL TIMES. 



RECOMMENDATIONS DISCUSSED WITH PRIMARY RN 

WILL FOLLOW UP PATIENT Q 7 -10 DAYS AND PRN.  PLEASE CONTACT WOUND CARE NURSE FOR ANY 
CONCERNS AND CHANGES IN WOUND CONDITION

## 2018-01-16 NOTE — NUR
REPOSITIONED PATIENT ON RIGHT LATERAL SIDE.  CLEANED PATIENT.  PATIENT TOLERATED WELL.  WILL 
CONTINUE TO MONITOR PATIENT.

## 2018-01-17 VITALS — SYSTOLIC BLOOD PRESSURE: 113 MMHG | DIASTOLIC BLOOD PRESSURE: 74 MMHG

## 2018-01-17 VITALS — SYSTOLIC BLOOD PRESSURE: 128 MMHG | DIASTOLIC BLOOD PRESSURE: 75 MMHG

## 2018-01-17 VITALS — SYSTOLIC BLOOD PRESSURE: 119 MMHG | DIASTOLIC BLOOD PRESSURE: 70 MMHG

## 2018-01-17 LAB
BASOPHILS # BLD AUTO: 0.4 K/UL (ref 0–0.22)
BASOPHILS NFR BLD AUTO: 3.4 % (ref 0–2)
EOSINOPHIL # BLD AUTO: 0.2 K/UL (ref 0–0.4)
EOSINOPHIL NFR BLD AUTO: 1.7 % (ref 0–4)
ERYTHROCYTE [DISTWIDTH] IN BLOOD BY AUTOMATED COUNT: 13.9 % (ref 11.6–13.7)
HCT VFR BLD AUTO: 24.2 % (ref 36–52)
HGB BLD-MCNC: 8 G/DL (ref 12–18)
LYMPHOCYTES # BLD AUTO: 2.9 K/UL (ref 2–11.5)
LYMPHOCYTES NFR BLD AUTO: 25.6 % (ref 20.5–51.1)
MCH RBC QN AUTO: 29 PG (ref 27–31)
MCHC RBC AUTO-ENTMCNC: 33 G/DL (ref 33–37)
MCV RBC AUTO: 88 FL (ref 80–94)
MONOCYTES # BLD AUTO: 0.7 K/UL (ref 0.8–1)
MONOCYTES NFR BLD AUTO: 6.1 % (ref 1.7–9.3)
NEUTROPHILS # BLD AUTO: 7 K/UL (ref 1.8–7.7)
NEUTROPHILS NFR BLD AUTO: 63.2 % (ref 42.2–75.2)
PLATELET # BLD AUTO: 692 K/UL (ref 140–450)
RBC # BLD AUTO: 2.76 MIL/UL (ref 4.2–6.1)
WBC # BLD AUTO: 11.2 K/UL (ref 4.8–10.8)

## 2018-01-17 RX ADMIN — ATORVASTATIN CALCIUM SCH MG: 20 TABLET, FILM COATED ORAL at 09:26

## 2018-01-17 RX ADMIN — Medication SCH EACH: at 09:26

## 2018-01-17 RX ADMIN — LINEZOLID SCH MLS/HR: 600 INJECTION, SOLUTION INTRAVENOUS at 09:25

## 2018-01-17 RX ADMIN — Medication SCH DEV: at 06:10

## 2018-01-17 RX ADMIN — MINERAL SUPPLEMENT IRON 300 MG / 5 ML STRENGTH LIQUID 100 PER BOX UNFLAVORED SCH MG: at 09:26

## 2018-01-17 RX ADMIN — PSYLLIUM HUSK SCH GM: 3.4 POWDER ORAL at 13:00

## 2018-01-17 RX ADMIN — Medication SCH EA: at 09:27

## 2018-01-17 RX ADMIN — Medication SCH MG: at 09:27

## 2018-01-17 RX ADMIN — MINERAL SUPPLEMENT IRON 300 MG / 5 ML STRENGTH LIQUID 100 PER BOX UNFLAVORED SCH MG: at 20:20

## 2018-01-17 RX ADMIN — Medication SCH DEV: at 17:12

## 2018-01-17 RX ADMIN — Medication SCH DEV: at 20:33

## 2018-01-17 RX ADMIN — LINEZOLID SCH MLS/HR: 600 INJECTION, SOLUTION INTRAVENOUS at 21:24

## 2018-01-17 RX ADMIN — LISINOPRIL SCH MG: 10 TABLET ORAL at 09:27

## 2018-01-17 RX ADMIN — SODIUM CHLORIDE SCH MLS/HR: 9 INJECTION, SOLUTION INTRAVENOUS at 11:39

## 2018-01-17 RX ADMIN — PANTOPRAZOLE SODIUM SCH MG: 40 INJECTION, POWDER, FOR SOLUTION INTRAVENOUS at 09:26

## 2018-01-17 RX ADMIN — IPRATROPIUM BROMIDE AND ALBUTEROL SULFATE SCH ML: .5; 3 SOLUTION RESPIRATORY (INHALATION) at 12:45

## 2018-01-17 RX ADMIN — PSYLLIUM HUSK SCH GM: 3.4 POWDER ORAL at 16:31

## 2018-01-17 RX ADMIN — LEVETIRACETAM SCH MG: 100 SOLUTION ORAL at 20:20

## 2018-01-17 RX ADMIN — Medication SCH DEV: at 11:40

## 2018-01-17 RX ADMIN — Medication SCH MG: at 20:24

## 2018-01-17 RX ADMIN — LEVETIRACETAM SCH MG: 100 SOLUTION ORAL at 09:26

## 2018-01-17 RX ADMIN — IPRATROPIUM BROMIDE AND ALBUTEROL SULFATE SCH ML: .5; 3 SOLUTION RESPIRATORY (INHALATION) at 08:10

## 2018-01-17 RX ADMIN — IPRATROPIUM BROMIDE AND ALBUTEROL SULFATE SCH ML: .5; 3 SOLUTION RESPIRATORY (INHALATION) at 19:17

## 2018-01-17 RX ADMIN — PSYLLIUM HUSK SCH GM: 3.4 POWDER ORAL at 09:26

## 2018-01-17 NOTE — NUR
RECEIVED PATIENT REPORT AT BEDSIDE FROM NIGHT NURSE. PATIENT IS APHASIC, NONVERBAL, TRACH TO 
VENT, ABLE TO FOLLOW COMMANDS. PATIENT SHOWS NO S/S OF ACUTE DISTRESS ON TRACH TO VENT 
SETTINGS OF FIO2 @ 28%  RR 20 AND O2 %. FLACC-0. SKIN INTACT WITH NOTED HEALED 
SACRAL WOUND. IV NOTED ON THE RT FOREARM WITH IVF'S INFUSING WELL. PATIENT WAS EXPLAINED POC 
FOR TODAY UNABLE TO VERBALIZE UNDERSTANDING.  BED IN LOW POSITION WITH CALL LIGHT WITHIN 
REACH, SAFETY, FALL RISK, CONTACT, SEIZURE, ASPIRATION, AND PRESSURE ULCER PRECAUTIONS IN 
PLACE.

## 2018-01-17 NOTE — NUR
PM MEDS GIVEN WITH EDUCATION. PATIENT UNABLE TO COMPREHEND. REINFORCEMENT NEEDED. PATIENT 
TOLERATED G TUBE MEDS WELL. 0 RESIDUAL. NO SIGNS OR SYMPTOMS OF ACUTE DISTRESS NOTED. SAFETY 
MEASURES ENSURED. CALL LIGHT WITHIN REACH. WILL CONTINUE TO MONITOR.

## 2018-01-17 NOTE — NUR
PATIENT IS RESTING COMFORTABLY IN BED AND SHOWS NO S/S OF ACUTE DISTRESS ON TRACH TO VENT, 
O2 %, HR 90, RR 21. HOB IS ELEVATED AT 45 DEGREES, BED IN LOW POSITION, CALL LIGHT 
WITHIN REACH.

## 2018-01-17 NOTE — NUR
PATIENT BLOOD SUGAR , NO INSULIN COVERAGE NEEDED. PATIENT SHOWS NO S/S OF ACUTE 
DISTRESS ON TRACH TO VENT.

## 2018-01-17 NOTE — NUR
TF HELD AND GT WAS CHECKED FOR POSITIVE PLACEMENT, ASPIRATED WITH NO RESIDUAL NOTED, AND 
FLUSHED WITH 30 CC OF STERILE WATER. SCHEDULED MEDICATIONS WERE GIVEN, IV ABX INFUSING WELL. 
GT WAS THEN FLUSHED WITH 30 CC OF STERILE WATER. GT IS PATENT AND INTACT.  HOB ELEVATED AT 
45 DEGRESS, TF RESUMED. PATIENT SHOWS NO S/S OF ACUTE DISTRESS ON TRACH TO VENT, RR 18, O2 
%, HR 79. BED IN LOW POSITION, BED ALARM ACTIVATED, WITH CALL LIGHT WITHIN REACH.

## 2018-01-17 NOTE — NUR
PATIENT IS RESTING COMFORTABLY IN BED AND SHOWS NO S/S OF ACUTE DISTRESS ON TRACH TO VENT, 
O2 %, HR 91, RR 20. HOB IS ELEVATED AT 45 DEGREES, BED IN LOW POSITION, CALL LIGHT 
WITHIN REACH.

## 2018-01-17 NOTE — NUR
TF HELD, PATIENT WAS ASSISTED WITH PERINEAL CARE, LEE CATHETER HAS CLEAR YELLOW URINE 
DRAINING 650CC.PATIENT TOLERATED ACTIVITY WELL. OPTIFOAM DRX AT SACRUM IS CLEAN DRY AND 
INTACT. PATIENT REPOSITIONED ON THE LEFT LATERAL SIDE. BED IN LOW POSITION, HOB ELEVATED AT 
45 DEGREES, BED ALARM ACTIVATED, CALL LIGHT WITHIN REACH, TF RESUMED.

## 2018-01-17 NOTE — NUR
RECEIVED HANDOFF REPORT FROM AM RN. PATIENT IS TRACH TO VENT. FIO2 28% RATE 12. PATIENT HAS 
G TUBE PATENT AND INTACT. IV SITE PATENT AND INTACT. LEE PATENT AND INTACT. PATIENT IS 
NONVERBAL AND APHASIC. PATIENT FOLLOWS SIMPLE COMMANDS. PATIENT DENIES PAIN, FLACC IS ALSO 
0.  SAFETY MEASURES ENSURED. FALL RISK PROTOCOL IN PLACE. NO SIGNS OR SYMPTOMS OF ACUTE 
DISTRESS NOTED. CALL LIGHT WITHIN REACH. WILL CONTINUE TO MONITOR.

## 2018-01-17 NOTE — NUR
TF HELD, PATIENT WAS ASSISTED WITH PERINEAL CARE, LEE CATHETER HAS CLEAR YELLOW URINE 
DRAINING 450CC, SPONGE BATH GIVEN. PATIENT TOLERATED ACTIVITY WELL. OPTIFOAM DRX AT SACRUM 
IS CLEAN DRY AND INTACT. PATIENT REPOSITIONED ON THE RIGHT LATERAL SIDE. BED IN LOW 
POSITION, HOB ELEVATED AT 45 DEGREES, BED ALARM ACTIVATED, CALL LIGHT WITHIN REACH, TF 
RESUMED.

## 2018-01-17 NOTE — NUR
TF HELD, PATIENT WAS ASSISTED WITH PERINEAL CARE, LEE CATHETER HAS CLEAR YELLOW URINE 
DRAINING 275CC,  PATIENT TOLERATED ACTIVITY WELL. OPTIFOAM DRX AT SACRUM IS CLEAN DRY AND 
INTACT. PATIENT REPOSITIONED ON THE RIGHT LATERAL SIDE. BED IN LOW POSITION, HOB ELEVATED AT 
45 DEGREES, BED ALARM ACTIVATED, CALL LIGHT WITHIN REACH, TF RESUMED.

## 2018-01-17 NOTE — NUR
TF HELD AND GT WAS CHECKED FOR POSITIVE PLACEMENT, ASPIRATED WITH NO RESIDUAL NOTED, AND 
FLUSHED WITH 30 CC OF STERILE WATER. SCHEDULED MEDICATIONS WERE GIVEN, GT WAS THEN FLUSHED 
WITH 30 CC OF STERILE WATER. GT IS PATENT AND INTACT.  HOB ELEVATED AT 45 DEGRESS, TF 
RESUMED. PATIENT SHOWS NO S/S OF ACUTE DISTRESS ON TRACH TO VENT, RR 18, O2 %, HR 81. 
BED IN LOW POSITION, BED ALARM ACTIVATED, WITH CALL LIGHT WITHIN REACH.

## 2018-01-17 NOTE — NUR
PATIENT GT FEEDING WAS LOW, PATIENT GIVEN NEW BAG OF DIABETISOURCE @70 ML/HR AND NEW TUBE 
LINES. TF HELD AND GT WAS CHECKED FOR POSITIVE PLACEMENT, ASPIRATED WITH NO RESIDUAL NOTED, 
AND FLUSHED WITH 30 CC OF STERILE WATER. SCHEDULED MEDICATIONS WERE GIVEN, EACH GT PO 
MEDICATIONS WAS INDIVIDUALLY CRUSHED AND GIVEN WITH 10CC OF STERILE WATER. GT WAS THEN 
FLUSHED WITH 30 CC OF STERILE WATER. GT IS PATENT AND INTACT.  HOB ELEVATED AT 45 DEGRESS, 
TF RESUMED. IV ABX WAS ADMINISTERED AND IS INFUSING WELL. PATIENT SHOWS NO S/S OF ACUTE 
DISTRESS ON TRACH TO VENT, RR 24, O2 %, HR 78. BED IN LOW POSITION, BED ALARM 
ACTIVATED, WITH CALL LIGHT WITHIN REACH.

## 2018-01-17 NOTE — NUR
MADE ROUNDS. PT NEED SUCTIONING. WITH SMALL AMOUNT OF SECRETIONS OBTAINED FROM TRACH. O2 SAT 
REMAINS 99%-100%

## 2018-01-18 VITALS — SYSTOLIC BLOOD PRESSURE: 145 MMHG | DIASTOLIC BLOOD PRESSURE: 82 MMHG

## 2018-01-18 VITALS — SYSTOLIC BLOOD PRESSURE: 103 MMHG | DIASTOLIC BLOOD PRESSURE: 58 MMHG

## 2018-01-18 VITALS — DIASTOLIC BLOOD PRESSURE: 83 MMHG | SYSTOLIC BLOOD PRESSURE: 148 MMHG

## 2018-01-18 RX ADMIN — PSYLLIUM HUSK SCH GM: 3.4 POWDER ORAL at 18:16

## 2018-01-18 RX ADMIN — ATORVASTATIN CALCIUM SCH MG: 20 TABLET, FILM COATED ORAL at 09:45

## 2018-01-18 RX ADMIN — PANTOPRAZOLE SODIUM SCH MG: 40 INJECTION, POWDER, FOR SOLUTION INTRAVENOUS at 09:43

## 2018-01-18 RX ADMIN — LINEZOLID SCH MLS/HR: 600 INJECTION, SOLUTION INTRAVENOUS at 09:43

## 2018-01-18 RX ADMIN — MINERAL SUPPLEMENT IRON 300 MG / 5 ML STRENGTH LIQUID 100 PER BOX UNFLAVORED SCH MG: at 09:00

## 2018-01-18 RX ADMIN — SODIUM CHLORIDE SCH MLS/HR: 9 INJECTION, SOLUTION INTRAVENOUS at 06:27

## 2018-01-18 RX ADMIN — PSYLLIUM HUSK SCH GM: 3.4 POWDER ORAL at 13:08

## 2018-01-18 RX ADMIN — Medication SCH MG: at 20:13

## 2018-01-18 RX ADMIN — Medication SCH MG: at 09:45

## 2018-01-18 RX ADMIN — LEVETIRACETAM SCH MG: 100 SOLUTION ORAL at 09:43

## 2018-01-18 RX ADMIN — IPRATROPIUM BROMIDE AND ALBUTEROL SULFATE SCH ML: .5; 3 SOLUTION RESPIRATORY (INHALATION) at 09:54

## 2018-01-18 RX ADMIN — Medication SCH DEV: at 20:12

## 2018-01-18 RX ADMIN — LEVETIRACETAM SCH MG: 100 SOLUTION ORAL at 20:13

## 2018-01-18 RX ADMIN — MINERAL SUPPLEMENT IRON 300 MG / 5 ML STRENGTH LIQUID 100 PER BOX UNFLAVORED SCH MG: at 20:13

## 2018-01-18 RX ADMIN — IPRATROPIUM BROMIDE AND ALBUTEROL SULFATE SCH ML: .5; 3 SOLUTION RESPIRATORY (INHALATION) at 19:12

## 2018-01-18 RX ADMIN — Medication SCH EACH: at 09:45

## 2018-01-18 RX ADMIN — Medication SCH DEV: at 16:30

## 2018-01-18 RX ADMIN — LINEZOLID SCH MLS/HR: 600 INJECTION, SOLUTION INTRAVENOUS at 20:51

## 2018-01-18 RX ADMIN — SODIUM CHLORIDE SCH MLS/HR: 9 INJECTION, SOLUTION INTRAVENOUS at 09:42

## 2018-01-18 RX ADMIN — PSYLLIUM HUSK SCH GM: 3.4 POWDER ORAL at 09:44

## 2018-01-18 RX ADMIN — Medication SCH EA: at 09:46

## 2018-01-18 RX ADMIN — IPRATROPIUM BROMIDE AND ALBUTEROL SULFATE SCH ML: .5; 3 SOLUTION RESPIRATORY (INHALATION) at 13:57

## 2018-01-18 RX ADMIN — Medication SCH DEV: at 06:30

## 2018-01-18 RX ADMIN — LISINOPRIL SCH MG: 10 TABLET ORAL at 09:45

## 2018-01-18 RX ADMIN — Medication SCH DEV: at 11:30

## 2018-01-18 NOTE — NUR
AWAKE STABLE NO SOB NOTED BREATH SOUNDS RHONCHI BILATERAL GOOD CHEST RISE DEEP TRACHEAL 
SUCTION FOR COPIOUS THICK YELLOW SECRETIONS AIRWAY PATENT

## 2018-01-18 NOTE — NUR
RECEIVED PT ON BED AWAKE AND ALERT, NON VERBAL, ORIENTED X2. NO SOB NOTED. NO SIGNS OF PAIN. 
ON TRACH TO VENT DEPENDENT . IV TO RT FOREARM PATENT AND INTACT. CHEST, DIMINISHED AIR ENTRY 
TO THE BASES. ABDOMEN SOFT, BOWEL SOUNDS PRESENT. WITH G-TUBE IN PLACE, 10 MLS RESIDUAL 
NOTED, WILL RESTART THE G-TUBE FEEDING AT 0800 HRS AS ORDERED. WITH LEE DRAINING MODERATE 
AMOUNTS OF CLEAR YELLOW URINE. SCD'S IN PLACE. WILL REPOSITION PT EVERY 2 HRS. BED ON LOW 
POSITION, BED ALARM ON. INSTRUCTED PT TO CALL FOR ASSISTANCE, CALL LIGHT WITH REACH ON PT'S 
RT HAND, PT NODED, VERBALIZED PARTIAL UNDERSTANDING.

## 2018-01-18 NOTE — NUR
RECEIVED HANDOFF REPORT FROM AM RN. PATIENT A&OX2. PATIENT CAN FOLLOW SIMPLE COMMANDS. 
PATIENT IS TRACH TO VENT RATE 12 PEEP 5 FIO2 28%. PATIENT HAS LEE CATH PATENT AND INTACT. 
IV SITE PATENT AND INTACT. SACRAL DRESSING DRY AND INTACT. NO SIGNS OR SYMPTOMS OF ACUTE 
DISTRESS NOTED. CALL LIGHT WITHIN REACH. FAMILY AT BEDSIDE. SAFETY MEASURES ENSURED. WILL 
CONTINUE TO MONITOR.

## 2018-01-18 NOTE — NUR
PATIENT RESTING IN BED. FLACC 0. NO SIGNS OR SYMPTOMS OF ACUTE DISTRESS NOTED. CALL LIGHT 
WITHIN REACH. WILL CONTINUE TO MONITOR.

## 2018-01-18 NOTE — NUR
IV ANTIBIOTIC GIVEN. PATIENT TOLERATED WELL. FLACC 0. PATIENT RESTING IN BED. NO SIGNS OR 
SYMPTOMS OF ACUTE DISTRESS NOTED. SAFETY MEASURES ENSURED. WILL CONTINUE TO MONITOR.

## 2018-01-18 NOTE — NUR
PT AWAKE, NON-VERBAL, NO SOB NOTED. NO SIGNS OF PAIN. FAMILY MEMBER AT THE  BEDSIDE. WILL 
ENDORSE TO NEXT SHIFT NURSE FOR CONTINUITY OF CARE.

## 2018-01-18 NOTE — NUR
ASLEEP RESTING COMFORTABLY NO EVIDENCE OF PULMONARY DISTRESS NOTED BREATH SOUNDS DIFFUSED 
RHONCHI BILATERAL WITH GOOD CHEST RISE DEEP TRACHEAL SUCTION FOR SMALL THICK YELLOW 
SECRETIONS AIRWAY PATENT

## 2018-01-18 NOTE — NUR
RECEIVED ON [employer] CARESCAPE R860 VENTILATOR PLUGGED INTO RED OUTLET TOLERATING WELL WITHOUT 
ADVERSE REACTIONS NOTED TO A PORTEX DCT #8 AIRWAY CUFF PRESSURE CHECKED AS NOTED  AMBU BAG 
AT HOB LOC AWAKE SKIN TONE PINK BREATH SOUNDS RHONCHI BILATERAL WITH GOOD CHEST RISE DEEP 
TRACHEAL SUCTION FOR LARGE THICK YELLOW SECRETIONS AIRWAY PATENT

## 2018-01-19 VITALS — DIASTOLIC BLOOD PRESSURE: 82 MMHG | SYSTOLIC BLOOD PRESSURE: 148 MMHG

## 2018-01-19 VITALS — DIASTOLIC BLOOD PRESSURE: 75 MMHG | SYSTOLIC BLOOD PRESSURE: 122 MMHG

## 2018-01-19 LAB
ANION GAP SERPL CALCULATED.3IONS-SCNC: 12.4 MMOL/L (ref 8–16)
BASOPHILS # BLD AUTO: 0.3 K/UL (ref 0–0.22)
BASOPHILS NFR BLD AUTO: 2.6 % (ref 0–2)
BUN SERPL-MCNC: 10 MG/DL (ref 7–18)
CHLORIDE SERPL-SCNC: 105 MMOL/L (ref 98–107)
CO2 SERPL-SCNC: 29.5 MMOL/L (ref 21–32)
CREAT SERPL-MCNC: 0.5 MG/DL (ref 0.7–1.3)
EOSINOPHIL # BLD AUTO: 0.2 K/UL (ref 0–0.4)
EOSINOPHIL NFR BLD AUTO: 2.1 % (ref 0–4)
ERYTHROCYTE [DISTWIDTH] IN BLOOD BY AUTOMATED COUNT: 14.4 % (ref 11.6–13.7)
GFR SERPL CREATININE-BSD FRML MDRD: 223 ML/MIN (ref 90–?)
GLUCOSE SERPL-MCNC: 106 MG/DL (ref 74–106)
HCT VFR BLD AUTO: 24.2 % (ref 36–52)
HGB BLD-MCNC: 7.9 G/DL (ref 12–18)
LYMPHOCYTES # BLD AUTO: 3.3 K/UL (ref 2–11.5)
LYMPHOCYTES NFR BLD AUTO: 29 % (ref 20.5–51.1)
MAGNESIUM SERPL-MCNC: 1.7 MG/DL (ref 1.8–2.4)
MCH RBC QN AUTO: 28 PG (ref 27–31)
MCHC RBC AUTO-ENTMCNC: 32 G/DL (ref 33–37)
MCV RBC AUTO: 89 FL (ref 80–94)
MONOCYTES # BLD AUTO: 0.8 K/UL (ref 0.8–1)
MONOCYTES NFR BLD AUTO: 6.7 % (ref 1.7–9.3)
NEUTROPHILS # BLD AUTO: 6.9 K/UL (ref 1.8–7.7)
NEUTROPHILS NFR BLD AUTO: 59.6 % (ref 42.2–75.2)
PHOSPHATE SERPL-MCNC: 3.7 MG/DL (ref 2.5–4.9)
PLATELET # BLD AUTO: 692 K/UL (ref 140–450)
POTASSIUM SERPL-SCNC: 3.9 MMOL/L (ref 3.5–5.1)
RBC # BLD AUTO: 2.73 MIL/UL (ref 4.2–6.1)
SODIUM SERPL-SCNC: 143 MMOL/L (ref 136–145)
WBC # BLD AUTO: 11.5 K/UL (ref 4.8–10.8)

## 2018-01-19 RX ADMIN — LEVETIRACETAM SCH MG: 100 SOLUTION ORAL at 09:39

## 2018-01-19 RX ADMIN — SODIUM CHLORIDE SCH MLS/HR: 9 INJECTION, SOLUTION INTRAVENOUS at 09:55

## 2018-01-19 RX ADMIN — LISINOPRIL SCH MG: 10 TABLET ORAL at 09:41

## 2018-01-19 RX ADMIN — ATORVASTATIN CALCIUM SCH MG: 20 TABLET, FILM COATED ORAL at 09:41

## 2018-01-19 RX ADMIN — PSYLLIUM HUSK SCH GM: 3.4 POWDER ORAL at 12:08

## 2018-01-19 RX ADMIN — Medication SCH EA: at 09:42

## 2018-01-19 RX ADMIN — LINEZOLID SCH MLS/HR: 600 INJECTION, SOLUTION INTRAVENOUS at 09:38

## 2018-01-19 RX ADMIN — Medication SCH DEV: at 07:00

## 2018-01-19 RX ADMIN — Medication SCH EACH: at 09:41

## 2018-01-19 RX ADMIN — PSYLLIUM HUSK SCH GM: 3.4 POWDER ORAL at 09:40

## 2018-01-19 RX ADMIN — PANTOPRAZOLE SODIUM SCH MG: 40 INJECTION, POWDER, FOR SOLUTION INTRAVENOUS at 09:39

## 2018-01-19 RX ADMIN — IPRATROPIUM BROMIDE AND ALBUTEROL SULFATE SCH ML: .5; 3 SOLUTION RESPIRATORY (INHALATION) at 12:22

## 2018-01-19 RX ADMIN — IPRATROPIUM BROMIDE AND ALBUTEROL SULFATE SCH ML: .5; 3 SOLUTION RESPIRATORY (INHALATION) at 06:55

## 2018-01-19 RX ADMIN — Medication SCH MG: at 09:41

## 2018-01-19 RX ADMIN — Medication SCH DEV: at 12:04

## 2018-01-19 RX ADMIN — MINERAL SUPPLEMENT IRON 300 MG / 5 ML STRENGTH LIQUID 100 PER BOX UNFLAVORED SCH MG: at 09:40

## 2018-01-19 NOTE — NUR
1430  PER PIPER BENITO [facility] PT CAN GO TO ROOM 22A.  CALLED [facility] AND SPOKE WITH INO AND PT 
SCHEDULED FOR CCT TRANSPORT AT 1545.  CALL REPORT TO [facility] [phone]

## 2018-01-19 NOTE — NUR
PT SUCTIONED OBTAINED SMALL AMOUNT OF WHITE SECRETIONS, AIRWAY IS PATENT AND TRACH REMAINS 
SECURE.PT NOT IN ANY DISTRESS. WILL CONTINUE TO MONITOR.

## 2018-01-19 NOTE — NUR
DISCHARGE PHOTOS ON HEALED SACRAL ULCER TAKEN AND DOCUMENTED. LEE CATHETER REMOVED. 
DRAINED 1600 MLS OF CLEAR URINE.

## 2018-01-19 NOTE — NUR
1/19/18 

RD FOLLOW UP COMPLETED.

PLEASE REFER TO NUTRITION PROGRESS NOTE UNDER CARE ACTIVITY FOR ESTIMATED NUTRITION NEEDS.



1.CONTINUE CURRENT TUBE FEEDING PT IS RECEIVING ENTERAL NUTRITION SUPPORT X 16 HRS/DAY.  
THIS IS PROVIDING 1344 KCALS AND 67 GM PRO/DAY.  TO MEET 100% ESTIMATED CALORIE AND PRO 
NEEDS/DAY--ADEQUATE.



2.RD TO FOLLOW-UP IN 2-3 DAYS PATIENT IS HIGH RISK.



HAYLEY TAPIA RD

## 2018-01-19 NOTE — NUR
RECEIVED PT ON BED AWAKE AND ALERT, NON VERBAL, ORIENTED X2. NO SOB NOTED. NO SIGNS OF PAIN. 
ON TRACH TO VENT DEPENDENT . IV TO RT FOREARM PATENT AND INTACT. CHEST, DIMINISHED AIR ENTRY 
TO THE BASES. ABDOMEN SOFT, BOWEL SOUNDS PRESENT. WITH G-TUBE IN PLACE, 15 MLS RESIDUAL 
NOTED, WILL RESTART THE G-TUBE FEEDING AT 0800 HRS AS ORDERED. WITH LEE DRAINING MODERATE 
AMOUNTS OF CLEAR YELLOW URINE. SCD'S IN PLACE. WILL REPOSITION PT EVERY 2 HRS. BED ON LOW 
POSITION, BED ALARM ON. INSTRUCTED PT TO CALL FOR ASSISTANCE, CALL LIGHT WITH REACH ON PT'S 
RT HAND, PT NODS AND EYE TRACKING NOTED, VERBALIZED PARTIAL UNDERSTANDING.

## 2018-01-19 NOTE — NUR
REPORT GIVEN [facility] [facility] VOICEMAIL MESSAGE LEFT AT PT'S DAUGHTER VARUN'S CELLPHONE 
REGARDING PT'S RETURN TO [facility].

## 2018-01-19 NOTE — NUR
RECEIVED TRACH PT ON VENT WITH A PORTEX 8. SETTINGS AC 12, , PEEP 5 AND FIO2 28%. PT 
IS IN BED EYES OPEN BUT IS NOT ALERT. TRACH IS SECURE WITH A PATENT AIRWAY. VENT IS PLUGGED 
INTO A RED OUTLET WITH ALARMS ON AND FUNCTIONING. PT IS NOT SOB AND NOT IN RESPIRATORY 
DISTRESS. PT SUCTIONED OBTAINED SMALL AMOUNT OF THIN CLEAR/WHITE SECRETIONS. WILL CONTINUE 
TO MONITOR

## 2018-01-19 NOTE — NUR
IV HEPLOCK G-TUBE CLAMPED. RESPIRATORY THERAPIST HELPED OUT WITH THE TRANSFER OF PT FROM 
BEDSIDE VENTILATOR TO [facility]'S PORTABLE MECHANICAL VENTILATOR. PT WHEELED OUT BY [facility] IN STABLE 
CONDITION. NO SOB NOTED. NO SIGNS OF PAIN. PT TRANSFERRED BACK TO [facility].

## 2020-01-23 ENCOUNTER — HOSPITAL ENCOUNTER (INPATIENT)
Dept: HOSPITAL 26 - MED | Age: 55
LOS: 3 days | Discharge: NURSING, REHAB OR LONG-TERM CARE | DRG: 720 | End: 2020-01-26
Payer: MEDICAID

## 2020-01-23 VITALS — BODY MASS INDEX: 19.93 KG/M2 | WEIGHT: 127 LBS | HEIGHT: 67 IN

## 2020-01-23 VITALS — DIASTOLIC BLOOD PRESSURE: 69 MMHG | SYSTOLIC BLOOD PRESSURE: 110 MMHG

## 2020-01-23 VITALS — SYSTOLIC BLOOD PRESSURE: 122 MMHG | DIASTOLIC BLOOD PRESSURE: 70 MMHG

## 2020-01-23 VITALS — DIASTOLIC BLOOD PRESSURE: 70 MMHG | SYSTOLIC BLOOD PRESSURE: 103 MMHG

## 2020-01-23 DIAGNOSIS — Z86.73: ICD-10-CM

## 2020-01-23 DIAGNOSIS — E78.5: ICD-10-CM

## 2020-01-23 DIAGNOSIS — J10.00: ICD-10-CM

## 2020-01-23 DIAGNOSIS — Z93.1: ICD-10-CM

## 2020-01-23 DIAGNOSIS — J18.9: ICD-10-CM

## 2020-01-23 DIAGNOSIS — G40.909: ICD-10-CM

## 2020-01-23 DIAGNOSIS — I12.9: ICD-10-CM
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
## 2020-01-23 DIAGNOSIS — Z93.0: ICD-10-CM

## 2020-01-23 DIAGNOSIS — N17.0: ICD-10-CM

## 2020-01-23 DIAGNOSIS — R13.10: ICD-10-CM

## 2020-01-23 DIAGNOSIS — K21.9: ICD-10-CM

## 2020-01-23 DIAGNOSIS — N18.9: ICD-10-CM

## 2020-01-23 DIAGNOSIS — A41.9: Primary | ICD-10-CM

## 2020-01-23 LAB
ALBUMIN FLD-MCNC: 3.6 G/DL (ref 3.4–5)
AMORPH SED URNS QL MICRO: (no result) /HPF
ANION GAP SERPL CALCULATED.3IONS-SCNC: 15.5 MMOL/L (ref 8–16)
APPEARANCE UR: (no result)
AST SERPL-CCNC: 15 U/L (ref 15–37)
BASOPHILS # BLD AUTO: 0.1 K/UL (ref 0–0.22)
BASOPHILS NFR BLD AUTO: 0.7 % (ref 0–2)
BILIRUB SERPL-MCNC: 0.4 MG/DL (ref 0–1)
BILIRUB UR QL STRIP: NEGATIVE
BUN SERPL-MCNC: 40 MG/DL (ref 7–18)
CHLORIDE SERPL-SCNC: 103 MMOL/L (ref 98–107)
CO2 SERPL-SCNC: 25.6 MMOL/L (ref 21–32)
COLOR UR: YELLOW
CREAT SERPL-MCNC: 1.5 MG/DL (ref 0.7–1.3)
EOSINOPHIL # BLD AUTO: 0.1 K/UL (ref 0–0.4)
EOSINOPHIL NFR BLD AUTO: 0.5 % (ref 0–4)
ERYTHROCYTE [DISTWIDTH] IN BLOOD BY AUTOMATED COUNT: 13.2 % (ref 11.6–13.7)
GFR SERPL CREATININE-BSD FRML MDRD: 62 ML/MIN (ref 90–?)
GLUCOSE SERPL-MCNC: 131 MG/DL (ref 74–106)
GLUCOSE UR STRIP-MCNC: NEGATIVE MG/DL
HCT VFR BLD AUTO: 37.8 % (ref 36–52)
HGB BLD-MCNC: 12.3 G/DL (ref 12–18)
HGB UR QL STRIP: NEGATIVE
LEUKOCYTE ESTERASE UR QL STRIP: (no result)
LYMPHOCYTES # BLD AUTO: 1.9 K/UL (ref 2–11.5)
LYMPHOCYTES NFR BLD AUTO: 11 % (ref 20.5–51.1)
MCH RBC QN AUTO: 27 PG (ref 27–31)
MCHC RBC AUTO-ENTMCNC: 32 G/DL (ref 33–37)
MCV RBC AUTO: 84.5 FL (ref 80–94)
MONOCYTES # BLD AUTO: 1.1 K/UL (ref 0.8–1)
MONOCYTES NFR BLD AUTO: 6 % (ref 1.7–9.3)
NEUTROPHILS # BLD AUTO: 14.3 K/UL (ref 1.8–7.7)
NEUTROPHILS NFR BLD AUTO: 81.8 % (ref 42.2–75.2)
NITRITE UR QL STRIP: NEGATIVE
PH UR STRIP: 5.5 [PH] (ref 5–9)
PLATELET # BLD AUTO: 331 K/UL (ref 140–450)
POTASSIUM SERPL-SCNC: 4.1 MMOL/L (ref 3.5–5.1)
RBC # BLD AUTO: 4.48 MIL/UL (ref 4.2–6.1)
RBC #/AREA URNS HPF: (no result) /HPF (ref 0–5)
SODIUM SERPL-SCNC: 140 MMOL/L (ref 136–145)
WBC # BLD AUTO: 17.5 K/UL (ref 4.8–10.8)
WBC,URINE: (no result) /HPF (ref 0–5)

## 2020-01-23 PROCEDURE — C1751 CATH, INF, PER/CENT/MIDLINE: HCPCS

## 2020-01-23 RX ADMIN — ACETAMINOPHEN PRN MG: 160 SOLUTION ORAL at 21:34

## 2020-01-23 RX ADMIN — ALBUTEROL SULFATE SCH MG: 2.5 SOLUTION RESPIRATORY (INHALATION) at 19:00

## 2020-01-23 RX ADMIN — SODIUM CHLORIDE SCH MLS/HR: 9 INJECTION, SOLUTION INTRAVENOUS at 15:01

## 2020-01-23 RX ADMIN — ALBUTEROL SULFATE SCH MG: 2.5 SOLUTION RESPIRATORY (INHALATION) at 23:15

## 2020-01-23 RX ADMIN — SODIUM CHLORIDE SCH MLS/HR: 9 INJECTION, SOLUTION INTRAVENOUS at 22:30

## 2020-01-23 NOTE — NUR
PT RESTING COFORTABLY, VSS. PT STATES PAIN 0/10 AFTER PAIN MEDICATIONS GIVEN. 
TEMPERATURE REMAINS 100.0 TEMPORAL SCAN, PROVIDED COOLING MEASURES, MOTRIN 
GIVEN.

## 2020-01-23 NOTE — NUR
RECEIVED REPORT FROM EMERGENCY ROOM NURSE. PT IN STABLE CONDITION. RESPIRATIONS EVEN AND 
UNLABORED, 02 3L VIA NC. NO IV ACCESS AT THIS TIME. SAFETY MEASURES IN PLACE. BED IN LOW 
POSITION. BED ALARM ON. CALL LIGHT AT BEDSIDE. FAMILY AT BEDSIDE. WILL CONTINUE TO MONITOR.

## 2020-01-23 NOTE — NUR
TRIED TO START MAINTANCE FLUIDS BUT IV CAME OUT OF RIGHT THUMB. PT IS HARD 
STICK. WILL ATTEMPT ANOTHER IV LINE

## 2020-01-23 NOTE — NUR
Patient will be admitted to care of DR MUKHERJEE. Admited to [facility].  Will go to room 
114. Belongings list completed.  Report to [facility].

## 2020-01-23 NOTE — NUR
RECIEVED PT AAOX1 , NID - O2 SAT -WNL , NO IV SITE - FOR PICC LINE INSERTION  W/ CONSENT 
FROM RELATIVE -ACCORDING TO JOSE MIGUEL THE PICC LINE TECH. ALREADY INFORMED . W/ G TUBE INTACT 
AND PATENT - SOFT ABD. - ACCORDING TO JOSE MIGUEL THE DR'S ORDER IS TO WAIT THE FNS CONSULT FOR 
REVISION OF TUBE FEEDING . PLAN OF CARE DISCUSSED BUT POOR UNDERSTANDING DUE TO MENTAL 
STATUS . ON FALL / SAFETY PRECAUTION PROTOCOL - BEDBOUND - ON TELE , W/ O2 AT 3LPM /NC . 
WILL CONT. TO MONITOR.

## 2020-01-23 NOTE — NUR
55 Y/O M ANDRE FROM [facility], PER EMS C/C FEVER/TACHYCARDIA X 0800 HOURS 
TODAY. PT NKA. HX CONTRACTURE OF LUE, NONTRAUMATIC ICH, MENTAL/BEHAVIORAL 
DISORDERS. RX SEE LIST. NEURO A/OX0. SIDE RAIL X2.

## 2020-01-23 NOTE — NUR
CLEANED AFTER URINATION AND SMALL BOWEL MOVEMENT. PT TOLERATED WELL. RESPIRATIONS EVEN AND 
UNLABORED. BED IN LOW POSITION. BED ALARM ON. CALL LIGHT AT BEDSIDE. WILL CONTINUE TO 
MONITOR.

## 2020-01-24 VITALS — DIASTOLIC BLOOD PRESSURE: 71 MMHG | SYSTOLIC BLOOD PRESSURE: 103 MMHG

## 2020-01-24 VITALS — SYSTOLIC BLOOD PRESSURE: 120 MMHG | DIASTOLIC BLOOD PRESSURE: 70 MMHG

## 2020-01-24 VITALS — DIASTOLIC BLOOD PRESSURE: 62 MMHG | SYSTOLIC BLOOD PRESSURE: 100 MMHG

## 2020-01-24 VITALS — DIASTOLIC BLOOD PRESSURE: 78 MMHG | SYSTOLIC BLOOD PRESSURE: 124 MMHG

## 2020-01-24 VITALS — SYSTOLIC BLOOD PRESSURE: 125 MMHG | DIASTOLIC BLOOD PRESSURE: 78 MMHG

## 2020-01-24 VITALS — DIASTOLIC BLOOD PRESSURE: 69 MMHG | SYSTOLIC BLOOD PRESSURE: 108 MMHG

## 2020-01-24 LAB
ANION GAP SERPL CALCULATED.3IONS-SCNC: 18.4 MMOL/L (ref 8–16)
BASOPHILS # BLD AUTO: 0 K/UL (ref 0–0.22)
BASOPHILS NFR BLD AUTO: 0.3 % (ref 0–2)
BUN SERPL-MCNC: 22 MG/DL (ref 7–18)
CHLORIDE SERPL-SCNC: 107 MMOL/L (ref 98–107)
CO2 SERPL-SCNC: 22.8 MMOL/L (ref 21–32)
CREAT SERPL-MCNC: 1.1 MG/DL (ref 0.7–1.3)
EOSINOPHIL # BLD AUTO: 0 K/UL (ref 0–0.4)
EOSINOPHIL NFR BLD AUTO: 0.1 % (ref 0–4)
ERYTHROCYTE [DISTWIDTH] IN BLOOD BY AUTOMATED COUNT: 13.1 % (ref 11.6–13.7)
GFR SERPL CREATININE-BSD FRML MDRD: 89 ML/MIN (ref 90–?)
GLUCOSE SERPL-MCNC: 109 MG/DL (ref 74–106)
HCT VFR BLD AUTO: 36.8 % (ref 36–52)
HGB BLD-MCNC: 12.1 G/DL (ref 12–18)
LYMPHOCYTES # BLD AUTO: 0.7 K/UL (ref 2–11.5)
LYMPHOCYTES NFR BLD AUTO: 7 % (ref 20.5–51.1)
MCH RBC QN AUTO: 28 PG (ref 27–31)
MCHC RBC AUTO-ENTMCNC: 33 G/DL (ref 33–37)
MCV RBC AUTO: 85.2 FL (ref 80–94)
MONOCYTES # BLD AUTO: 0.6 K/UL (ref 0.8–1)
MONOCYTES NFR BLD AUTO: 5.5 % (ref 1.7–9.3)
NEUTROPHILS # BLD AUTO: 9.1 K/UL (ref 1.8–7.7)
NEUTROPHILS NFR BLD AUTO: 87.1 % (ref 42.2–75.2)
PLATELET # BLD AUTO: 279 K/UL (ref 140–450)
POTASSIUM SERPL-SCNC: 4.2 MMOL/L (ref 3.5–5.1)
PROTHROMBIN TIME: 10.4 SECS (ref 10.8–13.4)
RBC # BLD AUTO: 4.31 MIL/UL (ref 4.2–6.1)
SODIUM SERPL-SCNC: 144 MMOL/L (ref 136–145)
WBC # BLD AUTO: 10.4 K/UL (ref 4.8–10.8)

## 2020-01-24 RX ADMIN — LEVOFLOXACIN SCH MG: 250 TABLET, FILM COATED ORAL at 10:23

## 2020-01-24 RX ADMIN — ALBUTEROL SULFATE SCH MG: 2.5 SOLUTION RESPIRATORY (INHALATION) at 07:28

## 2020-01-24 RX ADMIN — ALBUTEROL SULFATE SCH MG: 2.5 SOLUTION RESPIRATORY (INHALATION) at 11:14

## 2020-01-24 RX ADMIN — SODIUM CHLORIDE SCH MLS/HR: 9 INJECTION, SOLUTION INTRAVENOUS at 22:30

## 2020-01-24 RX ADMIN — OSELTAMIVIR PHOSPHATE SCH MG: 6 POWDER, FOR SUSPENSION ORAL at 10:37

## 2020-01-24 RX ADMIN — SODIUM CHLORIDE SCH MLS/HR: 9 INJECTION, SOLUTION INTRAVENOUS at 14:30

## 2020-01-24 RX ADMIN — ALBUTEROL SULFATE SCH MG: 2.5 SOLUTION RESPIRATORY (INHALATION) at 19:44

## 2020-01-24 RX ADMIN — SODIUM CHLORIDE SCH MLS/HR: 9 INJECTION, SOLUTION INTRAVENOUS at 10:00

## 2020-01-24 RX ADMIN — ACETAMINOPHEN PRN MG: 160 SOLUTION ORAL at 06:26

## 2020-01-24 RX ADMIN — OSELTAMIVIR PHOSPHATE SCH MG: 6 POWDER, FOR SUSPENSION ORAL at 20:46

## 2020-01-24 RX ADMIN — ALBUTEROL SULFATE SCH MG: 2.5 SOLUTION RESPIRATORY (INHALATION) at 02:34

## 2020-01-24 RX ADMIN — ALBUTEROL SULFATE SCH MG: 2.5 SOLUTION RESPIRATORY (INHALATION) at 16:00

## 2020-01-24 NOTE — NUR
RECIEVED PT AAOX4 , NID , IV SITE INTACT AND PATENT . NID  POC DISCUSSED AND VERBALIZE 
UNDERSTANDING , W/ HD ACCESS ON R CHEST . CALL LIGHT WITHIN REACH . WILL CONT. TO MONITOR.

-------------------------------------------------------------------------------

Addendum: 01/24/20 at 1955 by Caron Guillen RN

-------------------------------------------------------------------------------

THE ABOVE NURSE'S NOTES IS AN ERROR ENTRY - MNJV

## 2020-01-24 NOTE — NUR
RECEIVED REPORT FROM NIGHT NURSE. PATIENT IS AWAKE, ALERT, APHASIC. GT INTACT AND PATENT. O2 
ON @ 3L VIA NC. PER NIGHT NURSE PATIENT CURRENTLY DO NOT HAVE FEEDING INFUSING DUE TO 
PENDING FOOD AND NUTRITION CONSULT, ALSO REPORTED THAT DR. MUKHERJEE IS AWARE. PER NIGHT NURSE 
PICC LINE IS TO BE INSERTED TODAY, STILL WAITING FOR PICC LINE TO NURSE. NO S/S OF DISTRESS 
NOTED. BED ALARM ON, BED IN LOW POSITION. CALL LIGHT WITHIN REACH. PLANS OF CARE DISCUSSED.

## 2020-01-24 NOTE — NUR
ENDORSED TO AM SHIFT INFORM IFRAH FF. UP FNS CONSULT FOR G TUBE FEEDING AND FOLLOW UP THE 
PICC LINE INSERTION TODAY . PT.IS WITH STABLE CONDITION.

## 2020-01-24 NOTE — NUR
1/24/20 RD INITIAL ASSESSMENT COMPLETED



PLEASE REFER TO NUTRITION ASSESSMENT UNDER CARE ACTIVITY FOR ESTIMATED NUTRITIONAL NEEDS. 



1. CONTINUE GLUCERNA 1.2 @ 60 ML/HR X 24 HR

-THIS WILL PROVIDE 1440 ML OF VOLUME, 1728 KCAL AND 86 GM OF PROTEIN WHICH MEETS 100% OF 
ESTIMATED NEEDS

2. CONTINUE FREE WATER  ML Q6H

3. RD TO FOLLOW-UP 2-3 DAYS, HIGH RISK 



KAITLYN GARCIA RD

## 2020-01-24 NOTE — NUR
PATIENT IN BED ASLEEP, RESPIRATION EVEN AND UNLABORED, EASILY AROUSABLE TO TOUCH OR NAME. NO 
S/S OF DISTRESS NOTED. VS STABLE. GT INTACT AND PATENT WITH GLUCERNA FEEDING @ 60ML/HR. 
TOLERATING WELL. HOB ELEVATED. BED IN LOW POSITION, BED ALARM ON. CALL LIGHT WITHIN REACH.

## 2020-01-24 NOTE — NUR
RE INFORMED TO CHARGE NURSE ABOUT THE PICC LINE TECH STILL NOT ARRIVE - ACCORDING TO NEO 
SHE INFORMED NURSE SUPERVISOR -.

## 2020-01-24 NOTE — NUR
DC PL[age] YRS OLD MALE PATIENT WAS ADMITTED FROM [facility] WITH A DX OF PNEUMONIA . PT HAS A HX OF CVA 
HTN, DYSPHAGIA . WBC 17.5 FEVER 101 CHES XRAY SHOWED LEFT LOWER LOBE ATELECTASIS VS 
INFILTRATE ,AND INFLUENZA A+ .ADMINISTERED IVF, LEVAQUIN AND TAMIFLU ,RT PROTOCOL INITIATED. 
PER DR MUKHERJEE TO DC BACK TO [facility] . I CALLED [facility] SPOKE WITH ELY STATED THEY HAVE NO ISO ROOM 
AND SINCE PT IS INFLUENZA A+ NEEDS TO FINISH THE MEDS AT [facility]. DC PLAN TO GO BACK TO [facility] 
WHEN STABLE.CM TO FOLLOW.   

-------------------------------------------------------------------------------

Addendum: 20 at 0961 by Montana Hill 

-------------------------------------------------------------------------------

SANDRA attempted to arrange for patient to return to [facility], but Berta stated 
that there were no isolation beds available. SANDRA informed Charge Nurse Anne Marie. SW/CM will 
follow as needed.


-------------------------------------------------------------------------------

Addendum: 20 at 1109 by Montana Hill 

-------------------------------------------------------------------------------

SW was contacted by infections nurse Sage at [facility] [phone]. Per 
Alona, patient is able to return. SANDRA contacted Maryjo from [facility] [phone] for 
authorization. 



Transportation auth: #[id number]

Facility auth: #[id number]



SANDRA informed charge nurse Anne Marie to assist in setting transportation. SANDRA/JOSE will follow up as 
needed.


-------------------------------------------------------------------------------

Addendum: 20 at 1353 by Montana GALLEGO

-------------------------------------------------------------------------------

SANDRA contacted Berta from [facility] [phone]. Patient will be going to room 
16A under attending physician Dr. Vazquez.

## 2020-01-24 NOTE — NUR
PATIENT AWAKE, ALERT, NON APHASIC. O2 ON @ 3L VIA NC. NO S/S OF DISTRESS NOTED. STILL 
AWAITING FOR GLUCERNA FEEDING FROM FNS. CALL LIGHT WITHIN REACH. NEEDS MET AT THIS TIME. 
SAFETY MEASURES IN PLACE. DR. MUKHERJEE WAS AT BEDSIDE.

## 2020-01-24 NOTE — NUR
RECIEVED PT AAOX1 , NID , W/ G TUBE FEEDING , V/S WNL , POC DISCUSSED BUT POOR UNDERSTANDING 
DUE TO MENTAL STATUS .WILL CONT. ON SAFETY / FALL PRECAUTION PROTOCOL . WILL CONT. TO 
MONITOR.

## 2020-01-24 NOTE — NUR
PATIENT HAS BEEN SCREENED AND CATEGORIZED AS HIGH NUTRITION RISK. PATIENT WILL BE SEEN 
WITHIN 1-2 DAYS OF ADMISSION.



01/24/20-01/25/20



KAITLYN GARCIA RD

## 2020-01-24 NOTE — NUR
PATIENT AWAKE, ALERT, APHASIC. NO S/S OF DISTRESS NOTED. VS STABLE. GT INTACT AND PATENT 
WITH GLUCERNA FEEDING @ 60ML/HR. TOLERATING WELL. HOB ELEVATED. BED IN LOW POSITION, BED 
ALARM ON. CALL LIGHT WITHIN REACH.

## 2020-01-24 NOTE — NUR
GT FEEDING GLUCERNA @ 60ML/HR INFUSING VIA GT AS ORDERED. TOLERATING WELL. PER DR. MUKHERJEE NO 
NEED FOR PICC LINE PATIENT IS TRANSFERRING TO SNF TODAY. PATIENT IS AWAKE, RESPIRATION EVEN 
AND UNLABORED. NO S/S OF DISTRESS NOTED. CALL LIGHT WITHIN REACH.

## 2020-01-24 NOTE — NUR
Note:



Basic Screen: 

Yes

High Risk DC Screen 

Yes

 Name: 

MARILEE COLE

Home Tel: 

[phone]

Relationship: 

WIFE

Pre-Admission Living Arrangements: 

SNF

Prior ADL 

Total/Dependent

Current Home Health Name/Tel: 

N/A

Current DME/02 Name/Tel: 

HOSPITAL BED

Current Hospice Name/Tel: 

N/A

Current Dialysis Name/Tel: 

N/A

Healthcare Decision Maker: 

Next of Kin

 Other: 

MARILEE COLE - WIFE - [phone]

Advance Directive 

No

Physician Orders for Life Sustaining Treatment Form 

No

Patient/Family Have Educational Needs 

No

Discipline: 

Case Mgt/Social Svcs

Tentative Discharge Plan/Destination: 

SNF/ECF

Tentative Discharge Plan Summary: 

Patient is a 54-year-old male admitted for pneumonia. Patient has PMHX of 

cerebrovascular accident, dysphagia, hx of trach, hypertension, seizure 

disorder, and hyperlipedemia. Patient was admitted from [facility]. SW contacted Kemi from [facility] to verify 

demographics [phone]. Per Kemi, patient is a care home patient 

and is on a bed hold. Kemi stated patient has no advanced directive on 

file and patient's healthcare decision maker is Marilee Cole 

[phone]. Kemi stated that patient is non-ambulatory and is bed 

bound. Kemi reported that patient is not alert/oriented at baseline 

and communicates minimally through gestures. Tentative discharge plan is 

to return to [facility]. No fruther needs identified.

 Signature: 

JOSE Schmitt

Date: 

Jan 24, 2020

Time: 

10:43

## 2020-01-24 NOTE — NUR
DIETARY CONSULT RECEIVED FOR TUBE FEEDING. RECOMMENDATIONS FOR GLUCERNA 1.2 @ 60 ML/HR WITH 
FREE WATER FLUSH  ML Q6H WERE APPROVED BY DR. MUKHERJEE. TUBE FEED RECOMMENDATIONS GIVEN 
TO [facility] TO ENTER.

## 2020-01-24 NOTE — NUR
SCREEN FOR LOW ANGELA SCALE AT RISK, CONTINUE TO FOLLOW PRESSURE ULCER PREVENTION 
INTERVENTIONS.

-TURN AND REPOSITION PATIENT Q2H, ASSIST IF NEEDED 

-ASSESS AND MONITOR SKIN CONDITION DURING POSITION CHANGES

-OFFLOAD BILATERAL HEELS BY PLACING PILLOWS UNDER CALVES AT ALL TIMES, UNLESS OTHERWISE 
CONTRAINDICATED

-PRESSURE REDISTRIBUTION BY PLACING PILLOWS AND OFFLOADING SACRALCOCCYX

-KEEP SKIN CLEAN AND DRY AT ALL TIMES.

## 2020-01-25 VITALS — SYSTOLIC BLOOD PRESSURE: 100 MMHG | DIASTOLIC BLOOD PRESSURE: 62 MMHG

## 2020-01-25 VITALS — SYSTOLIC BLOOD PRESSURE: 117 MMHG | DIASTOLIC BLOOD PRESSURE: 68 MMHG

## 2020-01-25 VITALS — DIASTOLIC BLOOD PRESSURE: 68 MMHG | SYSTOLIC BLOOD PRESSURE: 96 MMHG

## 2020-01-25 VITALS — DIASTOLIC BLOOD PRESSURE: 63 MMHG | SYSTOLIC BLOOD PRESSURE: 95 MMHG

## 2020-01-25 VITALS — DIASTOLIC BLOOD PRESSURE: 60 MMHG | SYSTOLIC BLOOD PRESSURE: 90 MMHG

## 2020-01-25 VITALS — DIASTOLIC BLOOD PRESSURE: 63 MMHG | SYSTOLIC BLOOD PRESSURE: 100 MMHG

## 2020-01-25 RX ADMIN — OSELTAMIVIR PHOSPHATE SCH MG: 6 POWDER, FOR SUSPENSION ORAL at 21:57

## 2020-01-25 RX ADMIN — SODIUM CHLORIDE SCH MLS/HR: 9 INJECTION, SOLUTION INTRAVENOUS at 16:32

## 2020-01-25 RX ADMIN — OSELTAMIVIR PHOSPHATE SCH MG: 6 POWDER, FOR SUSPENSION ORAL at 08:43

## 2020-01-25 RX ADMIN — ALBUTEROL SULFATE SCH MG: 2.5 SOLUTION RESPIRATORY (INHALATION) at 15:07

## 2020-01-25 RX ADMIN — SODIUM CHLORIDE SCH MLS/HR: 9 INJECTION, SOLUTION INTRAVENOUS at 06:30

## 2020-01-25 RX ADMIN — ALBUTEROL SULFATE SCH MG: 2.5 SOLUTION RESPIRATORY (INHALATION) at 07:09

## 2020-01-25 RX ADMIN — SODIUM CHLORIDE SCH MLS/HR: 9 INJECTION, SOLUTION INTRAVENOUS at 22:30

## 2020-01-25 RX ADMIN — LEVOFLOXACIN SCH MG: 250 TABLET, FILM COATED ORAL at 08:43

## 2020-01-25 RX ADMIN — ALBUTEROL SULFATE SCH MG: 2.5 SOLUTION RESPIRATORY (INHALATION) at 10:59

## 2020-01-25 RX ADMIN — ALBUTEROL SULFATE SCH MG: 2.5 SOLUTION RESPIRATORY (INHALATION) at 19:27

## 2020-01-25 NOTE — NUR
PATIENT LYING DOWN IN BED SLEEPING, AROUSABLE BY VOICE. NO DISTRESS NOTED. SCHEDULED 
MEDICATIONS DUE GIVEN. WILL CONTINUE TO MONITOR. 

-------------------------------------------------------------------------------

Addendum: 01/25/20 at 0851 by Daryl Ocampo RN

-------------------------------------------------------------------------------

GTUBE RESIDUALS CHECKED AT 0 ML. TOLERATING FEEDING WELL. WILL CONTINUE TO MONITOR.

## 2020-01-25 NOTE — NUR
PT IN BED HOB UP 35%. ALL FALLS, ASPIRATIONS AND CONTACT PRECAUTIONS IN PLACE, DUE TO PT 
BEING BEDBOUND, HX OF DYSPHAGIA AND  POSITIVE FOR INFLUENZA A. PT IS AOX1,WITH NO S/S OF 
PAIN OR DISTRESS NOTED.PT IS ON TUBE FEEDING GLUCERNA RUNNING AT 60. PT HAS RESIDUAL OF 
20MLS. HE WAS GIVEN ORDERED TAMIFLU VIA G TUBE . PT HAS NO IV ACCESS AT THIS TIME. V/S AS 
FOLLOWS: T 97.8 P 103 R 20 B/P 100/63 02 93% ON ROOM AIR.

## 2020-01-25 NOTE — NUR
RECEIVED PT ON ROOM AIR WITH SP02 OF 98% AND COARSE BREATH SOUNDS. NO RESPIRATORY DISTRESS 
NOTED AT THIS TIME. HHN TX GIVEN AS ORDERED WITH NO ADVERSE REACTION. WILL CONTINUE TO 
MONITOR PT

## 2020-01-25 NOTE — NUR
RECEIVED REPORT FROM NIGHT SHIFT NURSE. PATIENT LYING DOWN IN BED SLEEPING, AROUSABLE BY 
VOICE. NO DISTRESS NOTED. FLACC 0. AAOX1, APHASIC,  ABLE TO FOLLOW COMMANDS AND NODS HEAD 
WHEN ASKED QUESTIONS. ON O2 3L/MIN VIA NC. RESPIRATIONS EVEN, UNLABORED, CTA ON ALL LOBES. 
NO IV SITE NOTED AS PAT[place] [facility] [place] [facility] GTUBE IN PLACE, ON CONTINUOUS FEEDING AS PER MD ORDERS. REVIEWED 
PLAN OF CARE WITH PATIENT. PATIENT VERBALIZED UNDERSTANDING. SAFETY MEASURES IN PLACE, CALL 
LIGHT WITHIN REACH. WILL CONTINUE TO MONITOR.

## 2020-01-25 NOTE — NUR
RECEIVED REPORT FORM SHONA MADRID DAYSHIFT NURSE AT BEDSIDE FOR CONTINUITY OF CARE, PT IN 
STABLE CONDITION.

## 2020-01-25 NOTE — NUR
PATIENT LYING DOWN IN BED SLEEPING, AROUSABLE BY VOICE. CONDITION UNCHANGED. WILL CONTINUE 
TO MONITOR.

## 2020-01-25 NOTE — NUR
ASSISTED CNA IN CLEANING AND REPOSITIONING PATIENT. PATIENT TOLERATED WELL. WILL CONTINUE TO 
MONITOR.

## 2020-01-26 VITALS — DIASTOLIC BLOOD PRESSURE: 74 MMHG | SYSTOLIC BLOOD PRESSURE: 110 MMHG

## 2020-01-26 VITALS — SYSTOLIC BLOOD PRESSURE: 97 MMHG | DIASTOLIC BLOOD PRESSURE: 68 MMHG

## 2020-01-26 LAB
ANION GAP SERPL CALCULATED.3IONS-SCNC: 15.9 MMOL/L (ref 8–16)
BASOPHILS # BLD AUTO: 0 K/UL (ref 0–0.22)
BASOPHILS NFR BLD AUTO: 0.5 % (ref 0–2)
BUN SERPL-MCNC: 15 MG/DL (ref 7–18)
CHLORIDE SERPL-SCNC: 102 MMOL/L (ref 98–107)
CO2 SERPL-SCNC: 25.7 MMOL/L (ref 21–32)
CREAT SERPL-MCNC: 0.9 MG/DL (ref 0.7–1.3)
EOSINOPHIL # BLD AUTO: 0 K/UL (ref 0–0.4)
EOSINOPHIL NFR BLD AUTO: 0.5 % (ref 0–4)
ERYTHROCYTE [DISTWIDTH] IN BLOOD BY AUTOMATED COUNT: 13.1 % (ref 11.6–13.7)
GFR SERPL CREATININE-BSD FRML MDRD: 112 ML/MIN (ref 90–?)
GLUCOSE SERPL-MCNC: 87 MG/DL (ref 74–106)
HCT VFR BLD AUTO: 34.8 % (ref 36–52)
HGB BLD-MCNC: 11.3 G/DL (ref 12–18)
LYMPHOCYTES # BLD AUTO: 2.4 K/UL (ref 2–11.5)
LYMPHOCYTES NFR BLD AUTO: 43.1 % (ref 20.5–51.1)
MCH RBC QN AUTO: 28 PG (ref 27–31)
MCHC RBC AUTO-ENTMCNC: 33 G/DL (ref 33–37)
MCV RBC AUTO: 84.7 FL (ref 80–94)
MONOCYTES # BLD AUTO: 0.5 K/UL (ref 0.8–1)
MONOCYTES NFR BLD AUTO: 9.2 % (ref 1.7–9.3)
NEUTROPHILS # BLD AUTO: 2.6 K/UL (ref 1.8–7.7)
NEUTROPHILS NFR BLD AUTO: 46.7 % (ref 42.2–75.2)
PLATELET # BLD AUTO: 286 K/UL (ref 140–450)
POTASSIUM SERPL-SCNC: 3.6 MMOL/L (ref 3.5–5.1)
RBC # BLD AUTO: 4.11 MIL/UL (ref 4.2–6.1)
SODIUM SERPL-SCNC: 140 MMOL/L (ref 136–145)
WBC # BLD AUTO: 5.7 K/UL (ref 4.8–10.8)

## 2020-01-26 RX ADMIN — ALBUTEROL SULFATE SCH MG: 2.5 SOLUTION RESPIRATORY (INHALATION) at 07:36

## 2020-01-26 RX ADMIN — SODIUM CHLORIDE SCH MLS/HR: 9 INJECTION, SOLUTION INTRAVENOUS at 06:30

## 2020-01-26 RX ADMIN — ALBUTEROL SULFATE SCH MG: 2.5 SOLUTION RESPIRATORY (INHALATION) at 00:00

## 2020-01-26 RX ADMIN — LEVOFLOXACIN SCH MG: 250 TABLET, FILM COATED ORAL at 10:01

## 2020-01-26 RX ADMIN — OSELTAMIVIR PHOSPHATE SCH MG: 6 POWDER, FOR SUSPENSION ORAL at 10:01

## 2020-01-26 RX ADMIN — ALBUTEROL SULFATE SCH MG: 2.5 SOLUTION RESPIRATORY (INHALATION) at 11:41

## 2020-01-26 RX ADMIN — ALBUTEROL SULFATE SCH MG: 2.5 SOLUTION RESPIRATORY (INHALATION) at 15:29

## 2020-01-26 RX ADMIN — ALBUTEROL SULFATE SCH MG: 2.5 SOLUTION RESPIRATORY (INHALATION) at 03:18

## 2020-01-26 RX ADMIN — SODIUM CHLORIDE SCH MLS/HR: 9 INJECTION, SOLUTION INTRAVENOUS at 14:30

## 2020-01-26 NOTE — NUR
Received bedside report from pm nurse Shannon. Pt resting in bed, awake, aphasic, no signs of 
distress. GT intact with ongoing Gluverna 1.2 @ 60ml/h. HOB elevated @ 30. Call light 
within reach.

## 2020-01-26 NOTE — NUR
PT IN BED ALL DROPLET, ASPIRATIONS AND FALLS PRECAUTIONS IN PLACE. GLUCERNA RUNNING AT 60 
MLS/HR AS ORDERED. PT HAS NO S/S OF PAIN OR DISTRESS NOTED.

## 2020-01-26 NOTE — NUR
PT WAS TURNED, CHANGED AND REPOSITIONED IN BED. ORAL CARE PROVIDED AT BEDSIDE. NO S/S OF 
PAIN OR DISTRESS NOTED. V/S AS FOLLOWS; T 98.9 P 104 R 20 B/P 97/68 02 94% ON ROOM AIR. ALL 
FALLS, DROPLETS AND ASPIRATION PRECAUTIONS IN PLACE . ALL REQUESTED NEEDS ATTENDED BY STAFF.

## 2020-01-26 NOTE — NUR
Left voicemail to spouse Marilee & daughter Sujatha re: discharge back to SNF. No response 
received.

## 2020-01-26 NOTE — NUR
Report given to Mayte MADRID from [facility]. Pt currently resting in bed, 
watching TV, no signs of distress, respirations even & nonlabored on room air. GT intact 
with ongoing Glucerna 1.2 @ 60ml/h. Call light within reach.

## 2020-01-26 NOTE — NUR
Pt left via gurney accompanied by [employer] EMTs. Pt is stable, no signs of 
distress. GT intact & flushed with water. All belongings with pt upon departure.

## 2020-07-09 ENCOUNTER — HOSPITAL ENCOUNTER (INPATIENT)
Dept: HOSPITAL 26 - MED | Age: 55
LOS: 4 days | Discharge: NURSING, REHAB OR LONG-TERM CARE | DRG: 720 | End: 2020-07-13
Attending: FAMILY MEDICINE | Admitting: FAMILY MEDICINE
Payer: COMMERCIAL

## 2020-07-09 VITALS — BODY MASS INDEX: 23.07 KG/M2 | HEIGHT: 67 IN | WEIGHT: 147 LBS

## 2020-07-09 VITALS — SYSTOLIC BLOOD PRESSURE: 102 MMHG | DIASTOLIC BLOOD PRESSURE: 72 MMHG

## 2020-07-09 VITALS — SYSTOLIC BLOOD PRESSURE: 119 MMHG | DIASTOLIC BLOOD PRESSURE: 72 MMHG

## 2020-07-09 DIAGNOSIS — R13.10: ICD-10-CM

## 2020-07-09 DIAGNOSIS — A41.89: Primary | ICD-10-CM

## 2020-07-09 DIAGNOSIS — G93.49: ICD-10-CM

## 2020-07-09 DIAGNOSIS — K21.9: ICD-10-CM

## 2020-07-09 DIAGNOSIS — G40.909: ICD-10-CM

## 2020-07-09 DIAGNOSIS — Z79.899: ICD-10-CM

## 2020-07-09 DIAGNOSIS — I10: ICD-10-CM

## 2020-07-09 DIAGNOSIS — E78.5: ICD-10-CM

## 2020-07-09 DIAGNOSIS — U07.1: ICD-10-CM

## 2020-07-09 DIAGNOSIS — Z86.73: ICD-10-CM

## 2020-07-09 DIAGNOSIS — J12.89: ICD-10-CM

## 2020-07-09 LAB
ALBUMIN FLD-MCNC: 3.8 G/DL (ref 3.4–5)
ANION GAP SERPL CALCULATED.3IONS-SCNC: 20.9 MMOL/L (ref 8–16)
APPEARANCE UR: CLEAR
AST SERPL-CCNC: 29 U/L (ref 15–37)
BASOPHILS # BLD AUTO: 0.1 K/UL (ref 0–0.22)
BASOPHILS NFR BLD AUTO: 1.2 % (ref 0–2)
BILIRUB SERPL-MCNC: 0.2 MG/DL (ref 0–1)
BILIRUB UR QL STRIP: NEGATIVE
BUN SERPL-MCNC: 28 MG/DL (ref 7–18)
C-REACTIVE PROTEIN QUANT: 5 MG/DL (ref 0–0.9)
CHLORIDE SERPL-SCNC: 98 MMOL/L (ref 98–107)
CO2 SERPL-SCNC: 26.8 MMOL/L (ref 21–32)
COLOR UR: YELLOW
CREAT SERPL-MCNC: 1.3 MG/DL (ref 0.6–1.3)
EOSINOPHIL # BLD AUTO: 0.1 K/UL (ref 0–0.4)
EOSINOPHIL NFR BLD AUTO: 1.1 % (ref 0–4)
ERYTHROCYTE [DISTWIDTH] IN BLOOD BY AUTOMATED COUNT: 14.3 % (ref 11.6–13.7)
FIBRINOGEN PPP-MCNC: 404 MG/DL (ref 200–400)
GFR SERPL CREATININE-BSD FRML MDRD: 73 ML/MIN (ref 90–?)
GLUCOSE SERPL-MCNC: 104 MG/DL (ref 74–106)
GLUCOSE UR STRIP-MCNC: NEGATIVE MG/DL
HCT VFR BLD AUTO: 39.7 % (ref 36–52)
HGB BLD-MCNC: 12.9 G/DL (ref 12–18)
HGB UR QL STRIP: NEGATIVE
LDH SERPL-CCNC: 188 U/L (ref 85–227)
LEUKOCYTE ESTERASE UR QL STRIP: NEGATIVE
LYMPHOCYTES # BLD AUTO: 1.3 K/UL (ref 2–11.5)
LYMPHOCYTES NFR BLD AUTO: 10.5 % (ref 20.5–51.1)
MCH RBC QN AUTO: 28 PG (ref 27–31)
MCHC RBC AUTO-ENTMCNC: 33 G/DL (ref 33–37)
MCV RBC AUTO: 85.2 FL (ref 80–94)
MONOCYTES # BLD AUTO: 1.2 K/UL (ref 0.8–1)
MONOCYTES NFR BLD AUTO: 9.8 % (ref 1.7–9.3)
NEUTROPHILS # BLD AUTO: 9.4 K/UL (ref 1.8–7.7)
NEUTROPHILS NFR BLD AUTO: 77.4 % (ref 42.2–75.2)
NITRITE UR QL STRIP: NEGATIVE
PH UR STRIP: 8 [PH] (ref 5–9)
PLATELET # BLD AUTO: 327 K/UL (ref 140–450)
POTASSIUM SERPL-SCNC: 4.7 MMOL/L (ref 3.5–5.1)
PROTHROMBIN TIME: 9.8 SECS (ref 10.8–13.4)
RBC # BLD AUTO: 4.66 MIL/UL (ref 4.2–6.1)
RSV AG SPEC QL IA: NEGATIVE
SODIUM SERPL-SCNC: 141 MMOL/L (ref 136–145)
WBC # BLD AUTO: 12.2 K/UL (ref 4.8–10.8)

## 2020-07-09 NOTE — NUR
CHECKED PATIENT. PATIENT IS AWAKE RESPIRATION EVEN UNLABORED ON 2L NC O2. NO DISTRESS NOTED. 
WILL CONTINUE TO MONITOR.

## 2020-07-09 NOTE — NUR
Patient will be admitted to care of [facility]. Admited to TELE.  Will go to room 
117. Belongings list completed.  Report to [facility].

## 2020-07-09 NOTE — NUR
RECEIVED BEDSIDE REPORT FROM ER NURSE. PATIENT IS AWAKE AND COOPERATIVE. PATIENT IS 
NON-VERBAL AAOX1. ADMITTING DIAGNOSIS SOB. NO DISTRESS NOTED. SKIN IS WARM AND DRY. IV 
PATENT AND INTACT. RESPIRATION EVEN UNLABORED ON 2L NC O2. HEART RATE TACHY. S1&S2 NOTED. 
LUNG SOUNDS CLEAR UPON AUSCULTATION. BOWEL SOUNDS PRESENTS IN ALL QUADRANTS. LAST BM 7/8/20. 
LEFT ARM CONTRACTED. PATIENT IS NON-AMBULATORY AND ATROPHY IS SEEN IN THE LOWER EXTREMITIES 
WITH NO CONTRACTIONS. OBTAINED MEDICAL HISTORY FROM [facility] NURSE NAMED DEBBIE. VITALS WERE 
TAKEN. MRSA SCREEN DONE. PLAN OF CARE UPDATED. ALL SAFETY MEASURES IN PLACE. BED IS AT LOW 
POSITION. CALL LIGHT WITHIN REACH. WILL CONTINUE TO MONITOR.

## 2020-07-09 NOTE — NUR
PT STILL WAITING FOR ROOM TO BE CLEAN. PT BIBA FOR COUGH,FEVER AND SOB. [place] [facility] [place] [facility] [place]GHING [place]. PT  FEVER AT 
FACILITY. PT TESTED FOR COVID 19 WAITING FOR RESULTS. PT HX OF CVA, 
NON-VERBAL-NORMAL BASELINE.

## 2020-07-10 VITALS — SYSTOLIC BLOOD PRESSURE: 127 MMHG | DIASTOLIC BLOOD PRESSURE: 79 MMHG

## 2020-07-10 VITALS — DIASTOLIC BLOOD PRESSURE: 73 MMHG | SYSTOLIC BLOOD PRESSURE: 123 MMHG

## 2020-07-10 VITALS — DIASTOLIC BLOOD PRESSURE: 76 MMHG | SYSTOLIC BLOOD PRESSURE: 113 MMHG

## 2020-07-10 VITALS — DIASTOLIC BLOOD PRESSURE: 65 MMHG | SYSTOLIC BLOOD PRESSURE: 102 MMHG

## 2020-07-10 VITALS — SYSTOLIC BLOOD PRESSURE: 108 MMHG | DIASTOLIC BLOOD PRESSURE: 82 MMHG

## 2020-07-10 LAB
ALBUMIN FLD-MCNC: 3.7 G/DL (ref 3.4–5)
ANION GAP SERPL CALCULATED.3IONS-SCNC: 14.3 MMOL/L (ref 8–16)
ANION GAP SERPL CALCULATED.3IONS-SCNC: 24.6 MMOL/L (ref 8–16)
AST SERPL-CCNC: 39 U/L (ref 15–37)
BASOPHILS # BLD AUTO: 0.1 K/UL (ref 0–0.22)
BASOPHILS NFR BLD AUTO: 1.1 % (ref 0–2)
BILIRUB SERPL-MCNC: 0.3 MG/DL (ref 0–1)
BUN SERPL-MCNC: 14 MG/DL (ref 7–18)
BUN SERPL-MCNC: 16 MG/DL (ref 7–18)
CHLORIDE SERPL-SCNC: 101 MMOL/L (ref 98–107)
CHLORIDE SERPL-SCNC: 102 MMOL/L (ref 98–107)
CO2 SERPL-SCNC: 17.6 MMOL/L (ref 21–32)
CO2 SERPL-SCNC: 24.5 MMOL/L (ref 21–32)
CREAT SERPL-MCNC: 1.1 MG/DL (ref 0.6–1.3)
CREAT SERPL-MCNC: 1.2 MG/DL (ref 0.6–1.3)
EOSINOPHIL # BLD AUTO: 0 K/UL (ref 0–0.4)
EOSINOPHIL NFR BLD AUTO: 0.1 % (ref 0–4)
ERYTHROCYTE [DISTWIDTH] IN BLOOD BY AUTOMATED COUNT: 14.3 % (ref 11.6–13.7)
GFR SERPL CREATININE-BSD FRML MDRD: 81 ML/MIN (ref 90–?)
GFR SERPL CREATININE-BSD FRML MDRD: 89 ML/MIN (ref 90–?)
GLUCOSE SERPL-MCNC: 108 MG/DL (ref 74–106)
GLUCOSE SERPL-MCNC: 118 MG/DL (ref 74–106)
HCT VFR BLD AUTO: 41.4 % (ref 36–52)
HGB BLD-MCNC: 13.4 G/DL (ref 12–18)
LYMPHOCYTES # BLD AUTO: 2.3 K/UL (ref 2–11.5)
LYMPHOCYTES NFR BLD AUTO: 21.8 % (ref 20.5–51.1)
MAGNESIUM SERPL-MCNC: 2.1 MG/DL (ref 1.8–2.4)
MCH RBC QN AUTO: 28 PG (ref 27–31)
MCHC RBC AUTO-ENTMCNC: 32 G/DL (ref 33–37)
MCV RBC AUTO: 86.3 FL (ref 80–94)
MONOCYTES # BLD AUTO: 0.7 K/UL (ref 0.8–1)
MONOCYTES NFR BLD AUTO: 6.3 % (ref 1.7–9.3)
NEUTROPHILS # BLD AUTO: 7.4 K/UL (ref 1.8–7.7)
NEUTROPHILS NFR BLD AUTO: 70.7 % (ref 42.2–75.2)
PHOSPHATE SERPL-MCNC: 3.7 MG/DL (ref 2.5–4.9)
PLATELET # BLD AUTO: 256 K/UL (ref 140–450)
POTASSIUM SERPL-SCNC: 3.8 MMOL/L (ref 3.5–5.1)
POTASSIUM SERPL-SCNC: 4.2 MMOL/L (ref 3.5–5.1)
RBC # BLD AUTO: 4.79 MIL/UL (ref 4.2–6.1)
SODIUM SERPL-SCNC: 137 MMOL/L (ref 136–145)
SODIUM SERPL-SCNC: 139 MMOL/L (ref 136–145)
WBC # BLD AUTO: 10.5 K/UL (ref 4.8–10.8)

## 2020-07-10 RX ADMIN — DEXTROSE AND SODIUM CHLORIDE SCH MLS/HR: 5; .45 INJECTION, SOLUTION INTRAVENOUS at 00:23

## 2020-07-10 RX ADMIN — SODIUM CHLORIDE SCH MLS/HR: 0.9 INJECTION, SOLUTION INTRAVENOUS at 18:00

## 2020-07-10 RX ADMIN — ACETAMINOPHEN PRN MG: 650 SUPPOSITORY RECTAL at 20:55

## 2020-07-10 RX ADMIN — LEVETIRACETAM SCH MG: 100 SOLUTION ORAL at 20:15

## 2020-07-10 RX ADMIN — ENOXAPARIN SODIUM SCH MG: 60 INJECTION SUBCUTANEOUS at 20:16

## 2020-07-10 RX ADMIN — OXYCODONE HYDROCHLORIDE AND ACETAMINOPHEN SCH MG: 500 TABLET ORAL at 21:52

## 2020-07-10 RX ADMIN — ATORVASTATIN CALCIUM SCH MG: 20 TABLET, FILM COATED ORAL at 20:16

## 2020-07-10 RX ADMIN — Medication SCH MG: at 20:17

## 2020-07-10 RX ADMIN — ACETAMINOPHEN PRN MG: 650 SUPPOSITORY RECTAL at 01:24

## 2020-07-10 RX ADMIN — DEXTROSE AND SODIUM CHLORIDE SCH MLS/HR: 5; .45 INJECTION, SOLUTION INTRAVENOUS at 11:56

## 2020-07-10 RX ADMIN — ACETAMINOPHEN PRN MG: 650 SUPPOSITORY RECTAL at 10:47

## 2020-07-10 NOTE — NUR
DC PLANN[age] YRS OLD MALE PATIENT [place] [facility] [place] . PT HAS A HX OF CVA, 
DYSPHAGIA HYPERTENSION AND SEIZURE DISORDER.CXR SHOWED BLUNTING OF THE LEFT COSTOPHRENIC 
ALLYN. COVID TEST POSITIVE  STARTED ON IVF, IV ABX AZITHROMYCIN AND ROCEPHIN . CONSULTED 
WITH ID DR GARVEY. [place] [facility] [place]POKE WITH NASRA THAT PT IS COVID POSITIVE PER NASRA WILL 
SEND PT TO ONE OF THE SISTER COVID GONZALES .CM TO FOLLOW 

-------------------------------------------------------------------------------

Addendum: 20 at 1547 by Lori Elizondo 

-------------------------------------------------------------------------------

SANDRA YAP [facility] MADE AWARE.  PROVIDED WITH TRANSPORT AUTH [id number].



PER NASRA WILL REVIEW CLINICALS AND WILL CALL US BACK FOR ROOM NUMBER.  WILL CALL TRANSPORT 
SET UP WITH [facility], PROVIDED NABEEL WITH THE AUTH.

-------------------------------------------------------------------------------

Addendum: 20 at 1609 by Marce Chavez 

-------------------------------------------------------------------------------

PATIENT WILL BE PICKED UP BY [facility] AT 5:30 PM TO BE [place] [facility] [place] NOTIFIED [facility] [place] [facility] [place]

-------------------------------------------------------------------------------

Addendum: 20 at 1611 by Lori Elizondo CM

-------------------------------------------------------------------------------

PER NASRA OF [facility], [place] GO TO ROOM  A UNDER DR. LECHUGA  DCP MADE AWARE.

-------------------------------------------------------------------------------

Addendum: 20 at 1612 by Marce Chavez CM

-------------------------------------------------------------------------------

PATIENT WILL BE [place] [facility] [place]-A ACCEPTING DOCTOR JACQUIE BUENO

-------------------------------------------------------------------------------

Addendum: 20 at 1652 by Lori Elizondo CM

-------------------------------------------------------------------------------

ADDITIONAL NOTES:



PER DONA OF [facility], THEY NEED A HARD COPY OF THE AUTH FROM [facility] TO BE FAX [phone].  
SANDRA OF [facility] MADE AWARE.

## 2020-07-10 NOTE — NUR
Received call from daughter Sujatha requesting to speak with patient. Went to pt's room and 
dialed Loida's number from room phone. Pt conversing with patient in Romansh. Ended the call 
per pt request.

## 2020-07-10 NOTE — NUR
Received report from pm nurse Courtney. Pt resting in bed, awake, no signs of distress. 
Respirations even & nonlabored in room air. Right forearm IV 24G intact with ongoing D5 
1/2NS @ 85ml/h. Call light within reach.

## 2020-07-10 NOTE — NUR
Initiated GT feeding of Jevity 1.2 @ 45ml/hr. GT intact with 0ml residual. HOB elevated @ 
35. Pt resting in bed, watching TV, verbally responsive in Divehi.

## 2020-07-10 NOTE — NUR
PATIENT HAS BEEN SCREENED AND CATEGORIZED AS HIGH NUTRITION RISK. PATIENT WILL BE SEEN 
WITHIN 1-2 DAYS OF ADMISSION.



07/10/20



KAITLYN GARCIA RD

## 2020-07-10 NOTE — NUR
7/10/20 RD INITIAL ASSESSMENT COMPLETED



PLEASE REFER TO NUTRITION ASSESSMENT UNDER CARE ACTIVITY FOR ESTIMATED NUTRITIONAL NEEDS. 



1. RECOMMEND GLUCERNA 1.2 @ 60 ML/HR X 24 HR.

-THIS WILL PROVIDE 1159 ML OF WATER, 1728 CALORIES, 86 GM OF PROTEIN WHICH MEETS 100% OF 
ESTIMATED NUTRITION NEEDS

2. RECOMMEND FREE WATER FLUSH  ML Q4H

3. RECOMMEND VITAMIN C 500 MG BID AND ZINC 220 MG DAILY

4. RD TO FOLLOW-UP 2-3  DAYS, HIGH RISK 



KAITLYN GARCIA RD

## 2020-07-10 NOTE — NUR
VITALS WERE TAKEN. PATIENT HAS A FEVER .2. COOLING MEASURES IN PLACE. PAGED DOCTOR FOR 
MEDICATION. WILL CONTINUE TO MONITOR.

## 2020-07-10 NOTE — NUR
MADE ROUND ON PATIENT. VS /82, 1029T, 113HR, 18RR, 97%, ON RA. RFA 24G  NOTE, 
FLUSHED, INTACT, PATENT, AND ASYMPTOMATIC, ADMINISTERED SCHEDULED MEDS AS ORDERED AS WELL AS 
PRN TYLENOL 650MG PER MD ORDER. PATIENT TOLERATED WELL. G TUBE PATENT WITH LITTLE 
IMMEASURABLE RESIDUAL, INFUSING JEVITY 1.5 AT 45ML/HR FWF 200MLS Q4H. CALL LIGHT WITHIN 
REACH. SAFETY PRECAUTIONS IN PLACE. WILL CONTINUE TO MONITOR.

## 2020-07-10 NOTE — NUR
REASSESSED TEMPERATURE AND RECEIVED 100.1. INITIATED COOLING MEASURES WITH ICE PACKS. WILL 
CONTINUE TO MONITOR. CALL LIGHT WITHIN REACH. SAFETY PRECAUTIONS IN PLACE.

## 2020-07-10 NOTE — NUR
RECEIVED ORDER FROM MD. ADMINISTERED TYLENOL RC SUPPOSITORY FOR FEVER 100.9. WILL CONTINUE 
TO MONITOR

## 2020-07-10 NOTE — NUR
Received verbal orders from Dr. Kennedy to resume all home meds and GT feeding, draw blood cx 
x2, and CXR in am. All orders noted and carried out. Spoke to Ellen (dietitian) & inquired 
on availability of Jevity 1.5 GT formula. Per dietitian, she will check and call back.

## 2020-07-10 NOTE — NUR
Administered acetaminophen supp for temp 100.5F. Cooling measures in place: room temp cool, 
sponge bath provided by CNA, removed heavy blanket. Pt awake, verbally responsive in 
Sierra Leonean, respirations even & nonlabored in room air.

## 2020-07-11 VITALS — DIASTOLIC BLOOD PRESSURE: 72 MMHG | SYSTOLIC BLOOD PRESSURE: 113 MMHG

## 2020-07-11 VITALS — SYSTOLIC BLOOD PRESSURE: 100 MMHG | DIASTOLIC BLOOD PRESSURE: 53 MMHG

## 2020-07-11 VITALS — SYSTOLIC BLOOD PRESSURE: 120 MMHG | DIASTOLIC BLOOD PRESSURE: 68 MMHG

## 2020-07-11 VITALS — SYSTOLIC BLOOD PRESSURE: 108 MMHG | DIASTOLIC BLOOD PRESSURE: 77 MMHG

## 2020-07-11 VITALS — SYSTOLIC BLOOD PRESSURE: 129 MMHG | DIASTOLIC BLOOD PRESSURE: 78 MMHG

## 2020-07-11 VITALS — DIASTOLIC BLOOD PRESSURE: 52 MMHG | SYSTOLIC BLOOD PRESSURE: 83 MMHG

## 2020-07-11 RX ADMIN — LEVETIRACETAM SCH MG: 100 SOLUTION ORAL at 08:31

## 2020-07-11 RX ADMIN — Medication SCH MG: at 08:31

## 2020-07-11 RX ADMIN — OXYCODONE HYDROCHLORIDE AND ACETAMINOPHEN SCH MG: 500 TABLET ORAL at 22:08

## 2020-07-11 RX ADMIN — SODIUM CHLORIDE SCH MLS/HR: 0.9 INJECTION, SOLUTION INTRAVENOUS at 15:30

## 2020-07-11 RX ADMIN — DOCUSATE SODIUM SCH MG: 50 LIQUID ORAL at 08:30

## 2020-07-11 RX ADMIN — LANSOPRAZOLE SCH MG: 30 CAPSULE, DELAYED RELEASE ORAL at 08:31

## 2020-07-11 RX ADMIN — OXYCODONE HYDROCHLORIDE AND ACETAMINOPHEN SCH MG: 500 TABLET ORAL at 08:32

## 2020-07-11 RX ADMIN — Medication SCH EACH: at 08:30

## 2020-07-11 RX ADMIN — Medication SCH MG: at 21:00

## 2020-07-11 RX ADMIN — ENOXAPARIN SODIUM SCH MG: 60 INJECTION SUBCUTANEOUS at 22:08

## 2020-07-11 RX ADMIN — ENOXAPARIN SODIUM SCH MG: 60 INJECTION SUBCUTANEOUS at 08:32

## 2020-07-11 RX ADMIN — ACETAMINOPHEN PRN MG: 650 SUPPOSITORY RECTAL at 04:10

## 2020-07-11 RX ADMIN — LEVETIRACETAM SCH MG: 100 SOLUTION ORAL at 22:07

## 2020-07-11 RX ADMIN — ATORVASTATIN CALCIUM SCH MG: 20 TABLET, FILM COATED ORAL at 22:07

## 2020-07-11 NOTE — NUR
RECEIVED PATIENT AWAKE AND ALERT IN BED. PT ON ROOM AIR, NO SOB OR S/S OF DISTRESS. PT ABLE 
TO MOVE RIGHT UPPER EXTREMITY BUT UNABLE TO VERBALIZE NEEDS. GTUBE IN PLACE WITH TUBE 
FEEDING RUNNING AT GOAL RATE OF 45ML/HR. BED LOWERED WITH SIDE RAILS UP, CALL LIGHT WITHIN 
REACH

## 2020-07-11 NOTE — NUR
PATIENT IS RESTING IN BED, NO SIGNS OF DISTRESS NOTED, OBSERVED CHEST RISE AND FALL. PATIENT 
UNABLE TO VERBALIZE OR EXPRESS SELF. WILL CONTINUE TO MONITOR.

## 2020-07-11 NOTE — NUR
MADE ROUND ON PATIENT, ASSESSED VITALS AND TEMPERATURE .3. ADMINISTERED MEDICATION AS 
ORDERED. WILL CONTINUE TO MONITOR.

## 2020-07-11 NOTE — NUR
PATIENT IS RESTING IN BED, CALL LIGHT ON AND WITHIN REACH. PATIENT SPOKE WITH DAUGHTER USING 
PATIENTS PHONE. WILL CONTINUE TO MONITOR.

## 2020-07-11 NOTE — NUR
PATIENT IS RESTING IN BED, MEDS TOLERATED WELL THROUGH [employer]. PATIENT IS APHASIC. WILL 
CONTINUE TO MONITOR.

## 2020-07-12 VITALS — SYSTOLIC BLOOD PRESSURE: 94 MMHG | DIASTOLIC BLOOD PRESSURE: 60 MMHG

## 2020-07-12 VITALS — SYSTOLIC BLOOD PRESSURE: 103 MMHG | DIASTOLIC BLOOD PRESSURE: 66 MMHG

## 2020-07-12 VITALS — SYSTOLIC BLOOD PRESSURE: 90 MMHG | DIASTOLIC BLOOD PRESSURE: 59 MMHG

## 2020-07-12 VITALS — SYSTOLIC BLOOD PRESSURE: 95 MMHG | DIASTOLIC BLOOD PRESSURE: 75 MMHG

## 2020-07-12 VITALS — DIASTOLIC BLOOD PRESSURE: 67 MMHG | SYSTOLIC BLOOD PRESSURE: 95 MMHG

## 2020-07-12 VITALS — DIASTOLIC BLOOD PRESSURE: 64 MMHG | SYSTOLIC BLOOD PRESSURE: 99 MMHG

## 2020-07-12 LAB
ALBUMIN FLD-MCNC: 2.9 G/DL (ref 3.4–5)
ANION GAP SERPL CALCULATED.3IONS-SCNC: 12.7 MMOL/L (ref 8–16)
AST SERPL-CCNC: 35 U/L (ref 15–37)
BASOPHILS # BLD AUTO: 0 K/UL (ref 0–0.22)
BASOPHILS NFR BLD AUTO: 1.2 % (ref 0–2)
BILIRUB SERPL-MCNC: 0.1 MG/DL (ref 0–1)
BUN SERPL-MCNC: 15 MG/DL (ref 7–18)
CHLORIDE SERPL-SCNC: 100 MMOL/L (ref 98–107)
CO2 SERPL-SCNC: 25.6 MMOL/L (ref 21–32)
CREAT SERPL-MCNC: 1 MG/DL (ref 0.6–1.3)
EOSINOPHIL # BLD AUTO: 0 K/UL (ref 0–0.4)
EOSINOPHIL NFR BLD AUTO: 0.1 % (ref 0–4)
ERYTHROCYTE [DISTWIDTH] IN BLOOD BY AUTOMATED COUNT: 14.3 % (ref 11.6–13.7)
GFR SERPL CREATININE-BSD FRML MDRD: 99 ML/MIN (ref 90–?)
GLUCOSE SERPL-MCNC: 142 MG/DL (ref 74–106)
HCT VFR BLD AUTO: 33.5 % (ref 36–52)
HGB BLD-MCNC: 11.1 G/DL (ref 12–18)
LYMPHOCYTES # BLD AUTO: 1.1 K/UL (ref 2–11.5)
LYMPHOCYTES NFR BLD AUTO: 35.3 % (ref 20.5–51.1)
MCH RBC QN AUTO: 28 PG (ref 27–31)
MCHC RBC AUTO-ENTMCNC: 33 G/DL (ref 33–37)
MCV RBC AUTO: 84.5 FL (ref 80–94)
MONOCYTES # BLD AUTO: 0.4 K/UL (ref 0.8–1)
MONOCYTES NFR BLD AUTO: 12.6 % (ref 1.7–9.3)
NEUTROPHILS # BLD AUTO: 1.6 K/UL (ref 1.8–7.7)
NEUTROPHILS NFR BLD AUTO: 50.8 % (ref 42.2–75.2)
PLATELET # BLD AUTO: 249 K/UL (ref 140–450)
POTASSIUM SERPL-SCNC: 4.3 MMOL/L (ref 3.5–5.1)
RBC # BLD AUTO: 3.96 MIL/UL (ref 4.2–6.1)
SODIUM SERPL-SCNC: 134 MMOL/L (ref 136–145)
WBC # BLD AUTO: 3.1 K/UL (ref 4.8–10.8)

## 2020-07-12 RX ADMIN — OXYCODONE HYDROCHLORIDE AND ACETAMINOPHEN SCH MG: 500 TABLET ORAL at 20:30

## 2020-07-12 RX ADMIN — OXYCODONE HYDROCHLORIDE AND ACETAMINOPHEN SCH MG: 500 TABLET ORAL at 11:19

## 2020-07-12 RX ADMIN — LEVETIRACETAM SCH MG: 100 SOLUTION ORAL at 11:19

## 2020-07-12 RX ADMIN — ENOXAPARIN SODIUM SCH MG: 60 INJECTION SUBCUTANEOUS at 20:31

## 2020-07-12 RX ADMIN — ENOXAPARIN SODIUM SCH MG: 60 INJECTION SUBCUTANEOUS at 11:03

## 2020-07-12 RX ADMIN — ATORVASTATIN CALCIUM SCH MG: 20 TABLET, FILM COATED ORAL at 20:30

## 2020-07-12 RX ADMIN — DOCUSATE SODIUM SCH MG: 50 LIQUID ORAL at 11:18

## 2020-07-12 RX ADMIN — LEVETIRACETAM SCH MG: 100 SOLUTION ORAL at 20:30

## 2020-07-12 RX ADMIN — Medication SCH MG: at 20:48

## 2020-07-12 RX ADMIN — SODIUM CHLORIDE SCH MLS/HR: 0.9 INJECTION, SOLUTION INTRAVENOUS at 17:00

## 2020-07-12 RX ADMIN — LANSOPRAZOLE SCH MG: 30 CAPSULE, DELAYED RELEASE ORAL at 11:19

## 2020-07-12 RX ADMIN — Medication SCH EACH: at 11:18

## 2020-07-12 RX ADMIN — Medication SCH MG: at 11:19

## 2020-07-12 NOTE — NUR
RECEIVED BEDSIDE REPORT FROM DAY SHIFT NURSE. PATIENT IS SLEEPING AROUSABLE BY NAME AND 
TOUCH. RESPIRATION EVEN UNLABORED ON ROOM AIR. NO DISTRESS NOTED. SKIN IS WARM AND DRY. IV 
PATENT AND INTACT. G-TUBE FEEDING NOTED. PLAN OF CARE WAS DISCUSSED. ALL SAFETY MEASURES IN 
PLACE. BED IS AT LOW POSITION. CALL LIGHT WITHIN REACH. WILL CONTINUE TO MONITOR.

## 2020-07-12 NOTE — NUR
RECEIVED PATIENT REPORT FROM KATARINA MADRID AT 1230. PATIENT IS RESTING COMFORTABLY IN BED, IS 
ABLE TO FOLLOW COMMANDS, NOD, AND SHAKE HEAD FOR "NO". HIS IVF'S ARE INFUSING WELL TKO. HOB 
IS ELEVATED AT 45 DEGREES WITH GT FEEDING INFUSING AT 45 ML/HR. NO RESIDUAL NOTED AT GTUBE 
SITE. PATIENT IS BEDBOUND. ON ROOM AIR AND SATURATING AT 96%. NO S/S OF ACUTE DISTRESS 
NOTED. LEFT SIDED WEAKNESS NOTED. PRECAUTIONS FOR FALL RISK AND DROPLET NOTED. BED IS 
LOWERED, LOCKED, AND TWO SIDE RAILS UP. 



1450 PATIENT DENIES PAIN. SATURATION LEVEL AT 96%. NO S/S OF ACUTE DISTRESS. NOTED.

## 2020-07-12 NOTE — NUR
PATIENT IS RESTING COMFORTABLY IN BED AND SHOWS NO S/S OF ACUTE DISTRESS NOTED ON ROOM AIR. 
PATIENT ENDORSED TO KASSY RN NIGHT SHIFT IN STABLE CONDITION.

## 2020-07-12 NOTE — NUR
1640 PATIENT IS RESTING COMFORTABLY IN BED, NO S/S OF ACUTE DISTRESS NOTED, IVF'S INFUSING 
WELL, GTUBE SITE MINIMAL LEAKING CHANGED DRAIN SPONGE DRESSING. VSS. PATIENT DENIES PAIN. 
THE BED IS LOWERED AND CALL LIGHT WITHIN REACH.



1730 WITH THE ASSISTANCE OF ANKITA EVANS PATIENT WAS PROVIDED WITH PERINEAL CARE, LINENS 
CHANGED, AND REPOSITIONED. PATIENT IS NOW RESTING COMFORTABLY IN BED. THE BED IS LOWERED 
WITH CALL LIGHT WITHIN REACH.

## 2020-07-12 NOTE — NUR
INITIAL ASSESSMENT DONE. VITALS WERE TAKEN. CHECKED G-TUBE RESIDUAL. OBTAINED 0CC.START NEW 
G-TUBE FEEDING. WILL CONTINUE TO MONITOR.

## 2020-07-12 NOTE — NUR
NOTE:





Information Provided By 

[facility] [place]

Comments 

SW WAS UNABLE TO MEET PATIENT AT BEDSIDE DUE TO MEDICAL CONDITION.

, Realtionship and Phone Number 

HARRY GONZALEZ

WIFE

[phone]

Healthcare Power of  

Yes

Does Patient Have a POLST 

No

Identifying Problems 

No Social Work Triggers

Is A Social Work Consult Needed 

No

Mandate Report Filed 

No

Explanation Of Identifying Problems 

PATIENT IS A 54-YEAR-OLD FEMALE ADMITTED FOR SHORTNESS OF BREATH. PATIENT 

AHS PMHX OF CEREBROVASCULAR ACCIDE, HYPERTENSION, AND SEIZURES.

Admitted From 

Residential Care

Skilled Nursing Facility 

[facility] - [phone]

Pre-Admission Level Of Functioning Status 

Total Care

Prior Resources/Services Used In Last 12 Months 

SNF correction Care

Prior DME 

Wheelchair

Home Support 

No Caregiver Issues

Financial Issues 

No Known Financial Issue

Referral To The Financial Counselor Needed 

No

Factors/Needs 

SNF/NH Placement

Explanation And Or Other Factors Affecting/Possible DC Needs 

PATIENT IS long term AND ON A BED HOLD.

Discharge Plan Comments 

TENTATIVE DISCHARGE PLAN IS FOR PATIENT TO RETURN TO [facility] [place].

DC Plan Status 

Initiated

## 2020-07-13 VITALS — DIASTOLIC BLOOD PRESSURE: 55 MMHG | SYSTOLIC BLOOD PRESSURE: 107 MMHG

## 2020-07-13 VITALS — DIASTOLIC BLOOD PRESSURE: 52 MMHG | SYSTOLIC BLOOD PRESSURE: 100 MMHG

## 2020-07-13 VITALS — DIASTOLIC BLOOD PRESSURE: 65 MMHG | SYSTOLIC BLOOD PRESSURE: 90 MMHG

## 2020-07-13 VITALS — DIASTOLIC BLOOD PRESSURE: 58 MMHG | SYSTOLIC BLOOD PRESSURE: 95 MMHG

## 2020-07-13 LAB
ALBUMIN FLD-MCNC: 3 G/DL (ref 3.4–5)
ANION GAP SERPL CALCULATED.3IONS-SCNC: 12.8 MMOL/L (ref 8–16)
AST SERPL-CCNC: 29 U/L (ref 15–37)
BASOPHILS # BLD AUTO: 0 K/UL (ref 0–0.22)
BASOPHILS NFR BLD AUTO: 0.1 % (ref 0–2)
BILIRUB SERPL-MCNC: 0.1 MG/DL (ref 0–1)
BUN SERPL-MCNC: 16 MG/DL (ref 7–18)
CHLORIDE SERPL-SCNC: 102 MMOL/L (ref 98–107)
CO2 SERPL-SCNC: 26.6 MMOL/L (ref 21–32)
CREAT SERPL-MCNC: 0.9 MG/DL (ref 0.6–1.3)
EOSINOPHIL # BLD AUTO: 0 K/UL (ref 0–0.4)
EOSINOPHIL NFR BLD AUTO: 0 % (ref 0–4)
ERYTHROCYTE [DISTWIDTH] IN BLOOD BY AUTOMATED COUNT: 14 % (ref 11.6–13.7)
GFR SERPL CREATININE-BSD FRML MDRD: 112 ML/MIN (ref 90–?)
GLUCOSE SERPL-MCNC: 137 MG/DL (ref 74–106)
HCT VFR BLD AUTO: 33.8 % (ref 36–52)
HGB BLD-MCNC: 11.1 G/DL (ref 12–18)
LYMPHOCYTES # BLD AUTO: 1.6 K/UL (ref 2–11.5)
LYMPHOCYTES NFR BLD AUTO: 33.6 % (ref 20.5–51.1)
MCH RBC QN AUTO: 28 PG (ref 27–31)
MCHC RBC AUTO-ENTMCNC: 33 G/DL (ref 33–37)
MCV RBC AUTO: 84.5 FL (ref 80–94)
MONOCYTES # BLD AUTO: 0.7 K/UL (ref 0.8–1)
MONOCYTES NFR BLD AUTO: 14.4 % (ref 1.7–9.3)
NEUTROPHILS # BLD AUTO: 2.4 K/UL (ref 1.8–7.7)
NEUTROPHILS NFR BLD AUTO: 51.9 % (ref 42.2–75.2)
PLATELET # BLD AUTO: 238 K/UL (ref 140–450)
POTASSIUM SERPL-SCNC: 4.4 MMOL/L (ref 3.5–5.1)
RBC # BLD AUTO: 4 MIL/UL (ref 4.2–6.1)
SODIUM SERPL-SCNC: 137 MMOL/L (ref 136–145)
WBC # BLD AUTO: 4.7 K/UL (ref 4.8–10.8)

## 2020-07-13 RX ADMIN — LEVETIRACETAM SCH MG: 100 SOLUTION ORAL at 10:19

## 2020-07-13 RX ADMIN — Medication SCH MG: at 09:00

## 2020-07-13 RX ADMIN — LANSOPRAZOLE SCH MG: 30 CAPSULE, DELAYED RELEASE ORAL at 10:20

## 2020-07-13 RX ADMIN — Medication SCH EACH: at 10:18

## 2020-07-13 RX ADMIN — DOCUSATE SODIUM SCH MG: 50 LIQUID ORAL at 10:18

## 2020-07-13 RX ADMIN — SODIUM CHLORIDE SCH MLS/HR: 0.9 INJECTION, SOLUTION INTRAVENOUS at 16:00

## 2020-07-13 RX ADMIN — OXYCODONE HYDROCHLORIDE AND ACETAMINOPHEN SCH MG: 500 TABLET ORAL at 10:19

## 2020-07-13 RX ADMIN — ENOXAPARIN SODIUM SCH MG: 60 INJECTION SUBCUTANEOUS at 10:21

## 2020-07-13 NOTE — NUR
PT IS AWAKE AND RESPONSIVE TO VERBAL STIMULI. ASSESSMENT DONE ON PT. IV LINE IN TACT. G TUBE 
FEEDING IN PLACE. NO RESIDUAL NOTED. VITAL SIGNS NORMAL. NOTED WITH BLOOD PRESSURE OF 
100/52.  BLOOD PRESSURE MEDICATIONS NOT GIVEN. SAFETY MEASURES IN PLACE. CALL LIGHT IN 
REACH.

## 2020-07-13 NOTE — NUR
7/13/20 

RD FOLLOW UP COMPLETED.

PLEASE REFER TO NUTRITION ASSESSMENT UNDER CARE ACTIVITY FOR ESTIMATED NUTRITIONAL NEEDS. 



RECOMMEND TO CONTINUE WITH CURRENT TF AS ORDERED TO CONTINUE TO MEET >75% ESTIMATED ENERGY 
AND PROTEIN NEEDS 



RD TO FOLLOW-UP IN 3-5 DAYS PATIENT IS MODERATE RISK.



HAYLEY TAPIA, RD

## 2020-07-13 NOTE — NUR
PT WAS DISCHARGED TO [facility]. REPORT GIVEN TO [facility]. PT WILL UNDER THE CARE 
OF DR. NICK. TO ROOM 40 A. PT WAS STABLE AT DISCHARGE. BELONGINGS WITH PT. ID BAND 
REMOVED. IV  REMOVED. NO ACTIVE BLEEDING. DISCHARGE INSTRUCTIONS GIVEN TO TRANSPORT TO [place] 
[place] [facility] INTACT. PT WAS AWAKE AND RESPONSIVE AT DISCHARGE. PT WAS TAKEN IN [facility] 
BY TRANSPORT PERSONALS. PT IS ON ROOM AIR. VITAL SIGNS BEFORE DISCHARGE WERE NORMAL.

## 2020-07-13 NOTE — NUR
SHIFT REPORT RECEIVED FROM NIGHT SHIFT NURSE. PT IS IN BED AT HIS TIME. NO DISTRESS NOTED. 
WILL CONTINUE TO MONITOR. CALL LIGHT IN REACH.

## 2020-07-15 LAB — DEPRECATED D DIMER PPP-ACNC: 550 NG/ML (ref 0–400)

## 2023-10-07 ENCOUNTER — HOSPITAL ENCOUNTER (EMERGENCY)
Dept: HOSPITAL 26 - MED | Age: 58
LOS: 1 days | Discharge: HOME | End: 2023-10-08
Payer: COMMERCIAL

## 2023-10-07 VITALS
SYSTOLIC BLOOD PRESSURE: 107 MMHG | DIASTOLIC BLOOD PRESSURE: 75 MMHG | RESPIRATION RATE: 16 BRPM | OXYGEN SATURATION: 96 % | TEMPERATURE: 97.4 F | HEART RATE: 88 BPM

## 2023-10-07 VITALS — WEIGHT: 140 LBS | BODY MASS INDEX: 27.48 KG/M2 | HEIGHT: 60 IN

## 2023-10-07 DIAGNOSIS — I10: ICD-10-CM

## 2023-10-07 DIAGNOSIS — Z86.69: ICD-10-CM

## 2023-10-07 DIAGNOSIS — K94.23: Primary | ICD-10-CM

## 2023-10-07 DIAGNOSIS — Z86.73: ICD-10-CM

## 2023-10-07 DIAGNOSIS — Z79.899: ICD-10-CM

## 2023-10-08 VITALS
HEART RATE: 91 BPM | TEMPERATURE: 97.4 F | RESPIRATION RATE: 18 BRPM | DIASTOLIC BLOOD PRESSURE: 98 MMHG | SYSTOLIC BLOOD PRESSURE: 140 MMHG | OXYGEN SATURATION: 95 %

## 2023-10-16 ENCOUNTER — HOSPITAL ENCOUNTER (EMERGENCY)
Dept: HOSPITAL 26 - MED | Age: 58
LOS: 1 days | Discharge: NURSING, REHAB OR LONG-TERM CARE | End: 2023-10-17
Payer: COMMERCIAL

## 2023-10-16 VITALS — BODY MASS INDEX: 30.73 KG/M2 | HEIGHT: 64 IN | WEIGHT: 180 LBS

## 2023-10-16 VITALS
OXYGEN SATURATION: 97 % | SYSTOLIC BLOOD PRESSURE: 118 MMHG | RESPIRATION RATE: 17 BRPM | DIASTOLIC BLOOD PRESSURE: 72 MMHG | HEART RATE: 74 BPM

## 2023-10-16 VITALS
OXYGEN SATURATION: 95 % | HEART RATE: 88 BPM | DIASTOLIC BLOOD PRESSURE: 78 MMHG | RESPIRATION RATE: 16 BRPM | SYSTOLIC BLOOD PRESSURE: 109 MMHG

## 2023-10-16 VITALS — TEMPERATURE: 98.6 F | OXYGEN SATURATION: 97 %

## 2023-10-16 VITALS — OXYGEN SATURATION: 97 %

## 2023-10-16 DIAGNOSIS — Z86.73: ICD-10-CM

## 2023-10-16 DIAGNOSIS — I10: ICD-10-CM

## 2023-10-16 DIAGNOSIS — K94.23: Primary | ICD-10-CM

## 2023-10-16 DIAGNOSIS — Z79.899: ICD-10-CM

## 2023-10-16 DIAGNOSIS — K21.9: ICD-10-CM

## 2024-08-27 NOTE — NUR
Thank you for visiting [facility] ([facility]) today for your pre-procedure evaluation, you were seen by     Samantha Meeson, MSN, NP-C  Adult-Gerontology Nurse Practitioner II  Department of Anesthesiology and Perioperative Medicine  Main phone [phone]  Direct phone [phone]  Fax [phone]    This summary includes instructions and information to aid you during your perioperative period.  Please read carefully. If you have any questions about your visit today, please call the number listed above.  If you become ill or have any changes to your health before your surgery, please contact your primary care provider and alert your surgeon.    Preparing for your Surgery       Exercises  Preoperative Deep Breathing Exercises  Why it is important to do deep breathing exercises before my surgery?  Deep breathing exercises strengthen your breathing muscles.  This helps you to recover after your surgery and decreases the chance of breathing complications.  How are the deep breathing exercises done?  Sit straight with your back supported.  Breathe in deeply and slowly through your nose. Your lower rib cage should expand and your abdomen may move forward.  Hold that breath for 3 to 5 seconds.  Breathe out through pursed lips, slowly and completely.  Rest and repeat 10 times every hour while awake.  Rest longer if you become dizzy or lightheaded.       Incentive Spirometer   You were provided with an incentive spirometer in [facility], please follow the below instructions.   You were not provided an incentive spirometer in [facility], please disregard the incentive spirometer instructions  What is an incentive spirometer?  An incentive spirometer is a device used before and after surgery to “exercise” your lungs.  It helps you to take deeper breaths to expand your lungs.  Below is an example of a basic incentive spirometer.  The device you receive may differ slightly but they all function the  RECEIVED BEDSIDE SHIFT REPORT FROM NIGHT SHIFT NURSE FOR CONTINUATION OF CARE. same.    Why do I need to use an incentive spirometer?  Using your incentive spirometer prepares your lungs for surgery and helps prevent lung problems after surgery.  How do I use my incentive spirometer?  When you're using your incentive spirometer, make sure to breathe through your mouth. If you breathe through your nose, the incentive spirometer won't work properly. You can hold your nose if you have trouble.  If you feel dizzy at any time, stop and rest. Try again at a later time.  Follow the steps below:  Set up your incentive spirometer, expand the flexible tubing and connect to the outlet.  Sit upright in a chair or bed. Hold the incentive spirometer at eye level.   Put the mouthpiece in your mouth and close your lips tightly around it. Slowly breathe out (exhale) completely.  Breathe in (inhale) slowly through your mouth as deeply as you can. As you take a breath, you will see the piston rise inside the large column. While the piston rises, the indicator should move upwards. It should stay in between the 2 arrows (see Figure).  Try to get the piston as high as you can, while keeping the indicator between the arrows.   If the indicator doesn't stay between the arrows, you're breathing either too fast or too slow.  When you get it as high as you can, hold your breath for 10 seconds, or as long as possible. While you're holding your breath, the piston will slowly fall to the base of the spirometer.  Once the piston reaches the bottom of the spirometer, breathe out slowly through your mouth. Rest for a few seconds.  Repeat 10 times. Try to get the piston to the same level with each breath.  Repeat every hour while awake  You can carefully clean the outside of the mouthpiece with an alcohol wipe or soap and water.      Preoperative Brain Exercises    What are brain exercises?  A brain exercise is any activity that engages your thinking (cognitive) skills.    What types of activities are considered brain  exercises?  Jigsaw puzzles, crossword puzzles, word jumble, memory games, word search, and many more.  Many can be found free online or on your phone via a mobile regina.    Why should I do brain exercises before my surgery?  More recent research has shown brain exercise before surgery can lower the risk of postoperative delirium (confusion) which can be especially important for older adults.  Patients who did brain exercises for 5 to 10 hours the days before surgery, cut their risk of postoperative delirium in half up to 1 week after surgery.    Sit-to-Stand Exercise    What is the sit-to-stand exercise?  The sit-to-stand exercise strengthens the muscles of your lower body and muscles in the center of your body (core muscles for stability) helping to maintain and improve your strength and mobility.  How do I do the sit-to-stand exercise?  The goal is to do this exercise without using your arms or hands.  If this is too difficult, use your arms and hands or a chair with armrests to help slowly push yourself to the standing position and lower yourself back to the sitting position. As the movement becomes easier use your arms and hands less.    Steps to the sit-to-stand exercise  Sit up tall in a sturdy chair, knees bent, feet flat on the floor shoulder-width apart.  Shift your hips/pelvis forward in the chair to correctly position yourself for the next movement.  Lean forward at your hips.  Stand up straight putting equal weight on both feet.  Check to be sure you are properly aligned with the chair, in a slow controlled movement sit back down.  Repeat this exercise 10-15 times.  If needed you can do it fewer times until your strength improves.  Rest for 1 minute.  Do another 10-15 sit-to-stand exercises.  Try to do this in the morning and evening.        Instructions    Preoperative Fasting Guidelines    Why must I stop eating and drinking near surgery time?  With sedation, food or liquid in your stomach can enter your  lungs causing serious complications  Food can increase nausea and vomiting  When do I need to stop eating and drinking before my surgery?      Do not eat any food after midnight the night before your surgery/procedure. You may have up to 13.5 ounces of clear liquid until TWO hours before your instructed arrival time to the hospital.  This includes water, black tea/coffee, (no milk or cream) apple juice, and electrolyte drinks (Gatorade). You may chew gum until TWO hours before your surgery/procedure            Simple things you can do to help prevent blood clots     Blood clots are blockages that can form in the body's veins. When a blood clot forms in your deep veins, it may be called a deep vein thrombosis, or DVT for short. Blood clots can happen in any part of the body where blood flows, but they are most common in the arms and legs. If a piece of a blood clot breaks free and travels to the lungs, it is called a pulmonary embolus (PE). A PE can be a very serious problem.         Being in the hospital or having surgery can raise your chances of getting a blood clot because you may not be well enough to move around as much as you normally do.         Ways you can help prevent blood clots in the hospital       Wearing SCDs  SCDs stands for Sequential Compression Devices.   SCDs are special sleeves that wrap around your legs. They attach to a pump that fills them with air to gently squeeze your legs every few minutes.  This helps return the blood in your legs to your heart.   SCDs should only be taken off when walking or bathing. SCDs may not be comfortable, but they can help save your life.              Pump SCD leg sleeves  Wearing compression stockings - if your doctor orders them. These special snug-fitting stockings gently squeeze your legs to help blood flow.       Walking. Walking helps move the blood in your legs.   If your doctor says it is ok, try walking the halls at least   5 times a day. Ask us to help  you get up, so you don't fall.      Taking any blood-thinning medicines your doctor orders.              Ways you can help prevent blood clots at home         Wearing compression stockings - if your doctor orders them.   Walking - to help move the blood in your legs.    Taking any blood-thinning medicines your doctor orders.      Signs of a blood clot or PE    Tell your doctor or nurse right away if you have any of the problems listed below.         If you are at home, seek medical care right away. Call 911 for chest pain or problems breathing.            Signs of a blood clot (DVT) - such as pain, swelling, redness, or warmth in your arm or legs.  Signs of a pulmonary embolism (PE) - such as chest pain or feeling short of breath      Tobacco and Alcohol;  Do not drink alcohol or smoke within 24 hours of surgery.  It is best to quit smoking for as long as possible before any surgery or procedure.        The Week before Surgery        Seven days before Surgery  Check your [facility] medication instructions  Do the exercises provided to you by [facility]   Arrange for a responsible, adult licensed  to take you home after surgery and stay with you for 24 hours.  You will not be permitted to drive yourself home if you have received any anesthetic/sedation  Six days before surgery  Check your [facility] medication instructions  Do the exercises provided to you by [facility]   Start using Chlorhexidene (CHG) body wash if prescribed  Five days before surgery  Check your [facility] medication instructions  Do the exercises provided to you by [facility]   Continue to use CHG body wash if prescribed  Three days before surgery  Check your [facility] medication instructions  Do the exercises provided to you by [facility]   Continue to use CHG body wash if prescribed  Two days before surgery  Check your [facility] medication instructions  Do the exercises provided to you by [facility]   Continue to use CHG body wash if prescribed    The Day before Surgery       Check your [facility] medication and  all other [facility] instructions including when to stop eating and drinking  You will be called with your arrival time for surgery in the late afternoon.  If you do not receive a call please reach out to your surgeon's office.  Do not smoke or drink 24 hours before surgery  Prepare items to bring with you to the hospital  Shower with your chlorhexidine wash if prescribed  Brush your teeth and use your chlorhexidine dental rinse if prescribed    The Day of Surgery       Check your [facility] medication instructions  Ensure you follow the instructions for when to stop eating and drinking  Shower, if prescribed use CHG.  Do not apply any lotions, creams, moisturizers, perfume or deodorant  Brush your teeth and use your CHG dental rinse if prescribed  Wear loose comfortable clothing  Avoid make-up  Remove  jewelry and piercings, consider professional piercing removal with a plastic spacer if needed  Bring photo ID and Insurance card  Bring an accurate medication list that includes medication dose, frequency and allergies  Bring a copy of your advanced directives (will, health care power of )  Bring any devices and controllers as well as medical devices you have been provided with for surgery (CPAP, slings, braces, etc.)  Dentures, eyeglasses, and contacts will be removed before surgery, please bring cases for contacts or glasses

## 2025-04-21 NOTE — NUR
MADE ROUNDS. PT IS ASLEEP. NO S/S OF ANY RESPIRATORY DISTRESS NOTED. O2 %. Provider: [place]  Caller: patient  Relationship to Patient: self  Call Back Phone Number: [phone]   Reason for Call: Calling about getting pt order to get fluid taken off chest. He has shortness of breath.